# Patient Record
Sex: FEMALE | Race: ASIAN | NOT HISPANIC OR LATINO | URBAN - METROPOLITAN AREA
[De-identification: names, ages, dates, MRNs, and addresses within clinical notes are randomized per-mention and may not be internally consistent; named-entity substitution may affect disease eponyms.]

---

## 2021-09-14 PROBLEM — Z00.00 ENCOUNTER FOR PREVENTIVE HEALTH EXAMINATION: Status: ACTIVE | Noted: 2021-09-14

## 2021-09-21 ENCOUNTER — OUTPATIENT (OUTPATIENT)
Dept: OUTPATIENT SERVICES | Facility: HOSPITAL | Age: 26
LOS: 1 days | End: 2021-09-21
Payer: MEDICAID

## 2021-09-21 ENCOUNTER — APPOINTMENT (OUTPATIENT)
Dept: OBGYN | Facility: CLINIC | Age: 26
End: 2021-09-21
Payer: MEDICAID

## 2021-09-21 VITALS
TEMPERATURE: 97.8 F | BODY MASS INDEX: 29.84 KG/M2 | HEIGHT: 59.5 IN | SYSTOLIC BLOOD PRESSURE: 129 MMHG | DIASTOLIC BLOOD PRESSURE: 87 MMHG | OXYGEN SATURATION: 100 % | RESPIRATION RATE: 16 BRPM | WEIGHT: 150 LBS | HEART RATE: 94 BPM

## 2021-09-21 DIAGNOSIS — E28.2 POLYCYSTIC OVARIAN SYNDROME: ICD-10-CM

## 2021-09-21 DIAGNOSIS — Z01.419 ENCOUNTER FOR GYNECOLOGICAL EXAMINATION (GENERAL) (ROUTINE) W/OUT ABNORMAL FINDINGS: ICD-10-CM

## 2021-09-21 DIAGNOSIS — Z78.9 OTHER SPECIFIED HEALTH STATUS: ICD-10-CM

## 2021-09-21 DIAGNOSIS — Z12.39 ENCOUNTER FOR OTHER SCREENING FOR MALIGNANT NEOPLASM OF BREAST: ICD-10-CM

## 2021-09-21 DIAGNOSIS — Z11.3 ENCOUNTER FOR SCREENING FOR INFECTIONS WITH A PREDOMINANTLY SEXUAL MODE OF TRANSMISSION: ICD-10-CM

## 2021-09-21 DIAGNOSIS — Z34.00 ENCOUNTER FOR SUPERVISION OF NORMAL FIRST PREGNANCY, UNSPECIFIED TRIMESTER: ICD-10-CM

## 2021-09-21 PROCEDURE — 99204 OFFICE O/P NEW MOD 45 MIN: CPT | Mod: 25

## 2021-09-21 PROCEDURE — 81220 CFTR GENE COM VARIANTS: CPT

## 2021-09-21 PROCEDURE — 87591 N.GONORRHOEAE DNA AMP PROB: CPT

## 2021-09-21 PROCEDURE — 36415 COLL VENOUS BLD VENIPUNCTURE: CPT

## 2021-09-21 PROCEDURE — 83655 ASSAY OF LEAD: CPT

## 2021-09-21 PROCEDURE — 86900 BLOOD TYPING SEROLOGIC ABO: CPT

## 2021-09-21 PROCEDURE — 36415 COLL VENOUS BLD VENIPUNCTURE: CPT | Mod: NC

## 2021-09-21 PROCEDURE — 86787 VARICELLA-ZOSTER ANTIBODY: CPT

## 2021-09-21 PROCEDURE — 83036 HEMOGLOBIN GLYCOSYLATED A1C: CPT

## 2021-09-21 PROCEDURE — 87086 URINE CULTURE/COLONY COUNT: CPT

## 2021-09-21 PROCEDURE — 87340 HEPATITIS B SURFACE AG IA: CPT

## 2021-09-21 PROCEDURE — 85025 COMPLETE CBC W/AUTO DIFF WBC: CPT

## 2021-09-21 PROCEDURE — 86850 RBC ANTIBODY SCREEN: CPT

## 2021-09-21 PROCEDURE — 86803 HEPATITIS C AB TEST: CPT

## 2021-09-21 PROCEDURE — 87389 HIV-1 AG W/HIV-1&-2 AB AG IA: CPT

## 2021-09-21 PROCEDURE — 86480 TB TEST CELL IMMUN MEASURE: CPT

## 2021-09-21 PROCEDURE — 87491 CHLMYD TRACH DNA AMP PROBE: CPT

## 2021-09-21 PROCEDURE — 81025 URINE PREGNANCY TEST: CPT

## 2021-09-21 PROCEDURE — 84443 ASSAY THYROID STIM HORMONE: CPT

## 2021-09-21 PROCEDURE — 86901 BLOOD TYPING SEROLOGIC RH(D): CPT

## 2021-09-21 PROCEDURE — 81329 SMN1 GENE DOS/DELETION ALYS: CPT

## 2021-09-21 PROCEDURE — 86762 RUBELLA ANTIBODY: CPT

## 2021-09-21 PROCEDURE — 86780 TREPONEMA PALLIDUM: CPT

## 2021-09-21 PROCEDURE — 81003 URINALYSIS AUTO W/O SCOPE: CPT

## 2021-09-21 PROCEDURE — 81243 FMR1 GEN ALY DETC ABNL ALLEL: CPT

## 2021-09-21 PROCEDURE — 83020 HEMOGLOBIN ELECTROPHORESIS: CPT

## 2021-09-21 PROCEDURE — G0463: CPT

## 2021-09-21 PROCEDURE — 84439 ASSAY OF FREE THYROXINE: CPT

## 2021-09-21 PROCEDURE — 86765 RUBEOLA ANTIBODY: CPT

## 2021-09-21 RX ORDER — FOLIC ACID/MULTIVIT,IRON,MINER 0.4MG-18MG
200 TABLET ORAL
Qty: 30 | Refills: 11 | Status: ACTIVE | COMMUNITY
Start: 2021-09-21 | End: 1900-01-01

## 2021-09-22 DIAGNOSIS — Z3A.01 LESS THAN 8 WEEKS GESTATION OF PREGNANCY: ICD-10-CM

## 2021-09-22 LAB
ABO + RH PNL BLD: NORMAL
BASOPHILS # BLD AUTO: 0.05 K/UL
BASOPHILS NFR BLD AUTO: 0.5 %
BLD GP AB SCN SERPL QL: NORMAL
C TRACH RRNA SPEC QL NAA+PROBE: NOT DETECTED
EOSINOPHIL # BLD AUTO: 0.2 K/UL
EOSINOPHIL NFR BLD AUTO: 1.9 %
ESTIMATED AVERAGE GLUCOSE: 108 MG/DL
HBA1C MFR BLD HPLC: 5.4 %
HBV SURFACE AG SER QL: NONREACTIVE
HCT VFR BLD CALC: 46.7 %
HCV AB SER QL: NONREACTIVE
HCV S/CO RATIO: 0.09 S/CO
HGB A MFR BLD: 97.6 %
HGB A2 MFR BLD: 2.4 %
HGB BLD-MCNC: 14.9 G/DL
HGB FRACT BLD-IMP: NORMAL
HIV1+2 AB SPEC QL IA.RAPID: NONREACTIVE
IMM GRANULOCYTES NFR BLD AUTO: 0.3 %
LYMPHOCYTES # BLD AUTO: 2.58 K/UL
LYMPHOCYTES NFR BLD AUTO: 24.6 %
MAN DIFF?: NORMAL
MCHC RBC-ENTMCNC: 30.4 PG
MCHC RBC-ENTMCNC: 31.9 GM/DL
MCV RBC AUTO: 95.3 FL
MEV IGG FLD QL IA: 84 AU/ML
MEV IGG+IGM SER-IMP: POSITIVE
MONOCYTES # BLD AUTO: 0.92 K/UL
MONOCYTES NFR BLD AUTO: 8.8 %
N GONORRHOEA RRNA SPEC QL NAA+PROBE: NOT DETECTED
NEUTROPHILS # BLD AUTO: 6.71 K/UL
NEUTROPHILS NFR BLD AUTO: 63.9 %
PLATELET # BLD AUTO: 292 K/UL
RBC # BLD: 4.9 M/UL
RBC # FLD: 12.6 %
RUBV IGG FLD-ACNC: 1.8 INDEX
RUBV IGG SER-IMP: POSITIVE
SOURCE TP AMPLIFICATION: NORMAL
T PALLIDUM AB SER QL IA: NEGATIVE
T4 FREE SERPL-MCNC: 1.1 NG/DL
TSH SERPL-ACNC: 2.95 UIU/ML
VZV AB TITR SER: POSITIVE
VZV IGG SER IF-ACNC: 2052 INDEX
WBC # FLD AUTO: 10.49 K/UL

## 2021-09-22 RX ORDER — PRENATAL VIT NO.130/IRON/FOLIC 27MG-0.8MG
27-0.8 TABLET ORAL DAILY
Qty: 30 | Refills: 5 | Status: ACTIVE | COMMUNITY
Start: 2021-09-22 | End: 1900-01-01

## 2021-09-23 LAB
BACTERIA UR CULT: NORMAL
LEAD BLD-MCNC: <1 UG/DL

## 2021-09-24 ENCOUNTER — TRANSCRIPTION ENCOUNTER (OUTPATIENT)
Age: 26
End: 2021-09-24

## 2021-09-24 DIAGNOSIS — Z01.419 ENCOUNTER FOR GYNECOLOGICAL EXAMINATION (GENERAL) (ROUTINE) WITHOUT ABNORMAL FINDINGS: ICD-10-CM

## 2021-09-24 DIAGNOSIS — Z3A.01 LESS THAN 8 WEEKS GESTATION OF PREGNANCY: ICD-10-CM

## 2021-09-24 DIAGNOSIS — Z11.3 ENCOUNTER FOR SCREENING FOR INFECTIONS WITH A PREDOMINANTLY SEXUAL MODE OF TRANSMISSION: ICD-10-CM

## 2021-09-24 DIAGNOSIS — Z12.39 ENCOUNTER FOR OTHER SCREENING FOR MALIGNANT NEOPLASM OF BREAST: ICD-10-CM

## 2021-09-24 LAB
M TB IFN-G BLD-IMP: NEGATIVE
QUANTIFERON TB PLUS MITOGEN MINUS NIL: 8.78 IU/ML
QUANTIFERON TB PLUS NIL: 0.03 IU/ML
QUANTIFERON TB PLUS TB1 MINUS NIL: -0.01 IU/ML
QUANTIFERON TB PLUS TB2 MINUS NIL: -0.01 IU/ML

## 2021-09-27 LAB — CYTOLOGY CVX/VAG DOC THIN PREP: NORMAL

## 2021-09-28 LAB
AR GENE MUT ANL BLD/T: NORMAL
CFTR MUT TESTED BLD/T: NEGATIVE
FMR1 GENE MUT ANL BLD/T: NORMAL

## 2021-10-19 ENCOUNTER — APPOINTMENT (OUTPATIENT)
Dept: OBGYN | Facility: CLINIC | Age: 26
End: 2021-10-19

## 2022-01-19 ENCOUNTER — EMERGENCY (EMERGENCY)
Facility: HOSPITAL | Age: 27
LOS: 1 days | Discharge: STILL ADMITTED OR ADMINISTRATIVE | End: 2022-01-19
Admitting: EMERGENCY MEDICINE
Payer: SELF-PAY

## 2022-01-19 ENCOUNTER — APPOINTMENT (OUTPATIENT)
Dept: ANTEPARTUM | Facility: CLINIC | Age: 27
End: 2022-01-19

## 2022-01-19 ENCOUNTER — ASOB RESULT (OUTPATIENT)
Age: 27
End: 2022-01-19

## 2022-01-19 ENCOUNTER — INPATIENT (INPATIENT)
Facility: HOSPITAL | Age: 27
LOS: 18 days | Discharge: HOME | End: 2022-02-07
Attending: OBSTETRICS & GYNECOLOGY | Admitting: OBSTETRICS & GYNECOLOGY
Payer: MEDICAID

## 2022-01-19 VITALS
TEMPERATURE: 98 F | RESPIRATION RATE: 16 BRPM | OXYGEN SATURATION: 100 % | SYSTOLIC BLOOD PRESSURE: 154 MMHG | DIASTOLIC BLOOD PRESSURE: 122 MMHG | HEART RATE: 107 BPM

## 2022-01-19 VITALS — OXYGEN SATURATION: 99 % | HEART RATE: 109 BPM

## 2022-01-19 DIAGNOSIS — Z3A.00 WEEKS OF GESTATION OF PREGNANCY NOT SPECIFIED: ICD-10-CM

## 2022-01-19 DIAGNOSIS — O16.9 UNSPECIFIED MATERNAL HYPERTENSION, UNSPECIFIED TRIMESTER: ICD-10-CM

## 2022-01-19 DIAGNOSIS — O26.899 OTHER SPECIFIED PREGNANCY RELATED CONDITIONS, UNSPECIFIED TRIMESTER: ICD-10-CM

## 2022-01-19 DIAGNOSIS — O14.90 UNSPECIFIED PRE-ECLAMPSIA, UNSPECIFIED TRIMESTER: ICD-10-CM

## 2022-01-19 LAB
ALBUMIN SERPL ELPH-MCNC: 3.8 G/DL — SIGNIFICANT CHANGE UP (ref 3.3–5)
ALBUMIN SERPL ELPH-MCNC: 3.9 G/DL — SIGNIFICANT CHANGE UP (ref 3.3–5)
ALBUMIN SERPL ELPH-MCNC: 4.1 G/DL — SIGNIFICANT CHANGE UP (ref 3.3–5)
ALP SERPL-CCNC: 86 U/L — SIGNIFICANT CHANGE UP (ref 40–120)
ALP SERPL-CCNC: 91 U/L — SIGNIFICANT CHANGE UP (ref 40–120)
ALP SERPL-CCNC: 93 U/L — SIGNIFICANT CHANGE UP (ref 40–120)
ALT FLD-CCNC: 21 U/L — SIGNIFICANT CHANGE UP (ref 4–33)
ALT FLD-CCNC: 22 U/L — SIGNIFICANT CHANGE UP (ref 4–33)
ALT FLD-CCNC: 23 U/L — SIGNIFICANT CHANGE UP (ref 4–33)
ANION GAP SERPL CALC-SCNC: 13 MMOL/L — SIGNIFICANT CHANGE UP (ref 7–14)
ANION GAP SERPL CALC-SCNC: 15 MMOL/L — HIGH (ref 7–14)
ANION GAP SERPL CALC-SCNC: 17 MMOL/L — HIGH (ref 7–14)
APPEARANCE UR: CLEAR — SIGNIFICANT CHANGE UP
APTT BLD: 29.1 SEC — SIGNIFICANT CHANGE UP (ref 27–36.3)
APTT BLD: 29.2 SEC — SIGNIFICANT CHANGE UP (ref 27–36.3)
APTT BLD: 32 SEC — SIGNIFICANT CHANGE UP (ref 27–36.3)
AST SERPL-CCNC: 21 U/L — SIGNIFICANT CHANGE UP (ref 4–32)
AST SERPL-CCNC: 22 U/L — SIGNIFICANT CHANGE UP (ref 4–32)
AST SERPL-CCNC: 28 U/L — SIGNIFICANT CHANGE UP (ref 4–32)
BACTERIA # UR AUTO: NEGATIVE — SIGNIFICANT CHANGE UP
BASOPHILS # BLD AUTO: 0.05 K/UL — SIGNIFICANT CHANGE UP (ref 0–0.2)
BASOPHILS # BLD AUTO: 0.06 K/UL — SIGNIFICANT CHANGE UP (ref 0–0.2)
BASOPHILS # BLD AUTO: 0.07 K/UL — SIGNIFICANT CHANGE UP (ref 0–0.2)
BASOPHILS NFR BLD AUTO: 0.4 % — SIGNIFICANT CHANGE UP (ref 0–2)
BASOPHILS NFR BLD AUTO: 0.4 % — SIGNIFICANT CHANGE UP (ref 0–2)
BASOPHILS NFR BLD AUTO: 0.5 % — SIGNIFICANT CHANGE UP (ref 0–2)
BILIRUB SERPL-MCNC: 0.3 MG/DL — SIGNIFICANT CHANGE UP (ref 0.2–1.2)
BILIRUB SERPL-MCNC: 0.3 MG/DL — SIGNIFICANT CHANGE UP (ref 0.2–1.2)
BILIRUB SERPL-MCNC: 0.4 MG/DL — SIGNIFICANT CHANGE UP (ref 0.2–1.2)
BILIRUB UR-MCNC: NEGATIVE — SIGNIFICANT CHANGE UP
BUN SERPL-MCNC: 4 MG/DL — LOW (ref 7–23)
BUN SERPL-MCNC: 6 MG/DL — LOW (ref 7–23)
BUN SERPL-MCNC: 6 MG/DL — LOW (ref 7–23)
CALCIUM SERPL-MCNC: 7.5 MG/DL — LOW (ref 8.4–10.5)
CALCIUM SERPL-MCNC: 8.1 MG/DL — LOW (ref 8.4–10.5)
CALCIUM SERPL-MCNC: 9.6 MG/DL — SIGNIFICANT CHANGE UP (ref 8.4–10.5)
CHLORIDE SERPL-SCNC: 100 MMOL/L — SIGNIFICANT CHANGE UP (ref 98–107)
CHLORIDE SERPL-SCNC: 98 MMOL/L — SIGNIFICANT CHANGE UP (ref 98–107)
CHLORIDE SERPL-SCNC: 98 MMOL/L — SIGNIFICANT CHANGE UP (ref 98–107)
CO2 SERPL-SCNC: 20 MMOL/L — LOW (ref 22–31)
CO2 SERPL-SCNC: 20 MMOL/L — LOW (ref 22–31)
CO2 SERPL-SCNC: 21 MMOL/L — LOW (ref 22–31)
COLOR SPEC: YELLOW — SIGNIFICANT CHANGE UP
COVID-19 SPIKE DOMAIN AB INTERP: POSITIVE
COVID-19 SPIKE DOMAIN ANTIBODY RESULT: >250 U/ML — HIGH
CREAT ?TM UR-MCNC: 90 MG/DL — SIGNIFICANT CHANGE UP
CREAT SERPL-MCNC: 0.48 MG/DL — LOW (ref 0.5–1.3)
CREAT SERPL-MCNC: 0.49 MG/DL — LOW (ref 0.5–1.3)
CREAT SERPL-MCNC: 0.53 MG/DL — SIGNIFICANT CHANGE UP (ref 0.5–1.3)
DIFF PNL FLD: NEGATIVE — SIGNIFICANT CHANGE UP
EOSINOPHIL # BLD AUTO: 0.03 K/UL — SIGNIFICANT CHANGE UP (ref 0–0.5)
EOSINOPHIL # BLD AUTO: 0.06 K/UL — SIGNIFICANT CHANGE UP (ref 0–0.5)
EOSINOPHIL # BLD AUTO: 0.14 K/UL — SIGNIFICANT CHANGE UP (ref 0–0.5)
EOSINOPHIL NFR BLD AUTO: 0.2 % — SIGNIFICANT CHANGE UP (ref 0–6)
EOSINOPHIL NFR BLD AUTO: 0.5 % — SIGNIFICANT CHANGE UP (ref 0–6)
EOSINOPHIL NFR BLD AUTO: 1 % — SIGNIFICANT CHANGE UP (ref 0–6)
EPI CELLS # UR: 12 /HPF — HIGH (ref 0–5)
FIBRINOGEN PPP-MCNC: 562 MG/DL — HIGH (ref 290–520)
FIBRINOGEN PPP-MCNC: 602 MG/DL — HIGH (ref 290–520)
FIBRINOGEN PPP-MCNC: 637 MG/DL — HIGH (ref 290–520)
GLUCOSE SERPL-MCNC: 103 MG/DL — HIGH (ref 70–99)
GLUCOSE SERPL-MCNC: 125 MG/DL — HIGH (ref 70–99)
GLUCOSE SERPL-MCNC: 126 MG/DL — HIGH (ref 70–99)
GLUCOSE UR QL: NEGATIVE — SIGNIFICANT CHANGE UP
HCT VFR BLD CALC: 39.4 % — SIGNIFICANT CHANGE UP (ref 34.5–45)
HCT VFR BLD CALC: 42.3 % — SIGNIFICANT CHANGE UP (ref 34.5–45)
HCT VFR BLD CALC: 42.7 % — SIGNIFICANT CHANGE UP (ref 34.5–45)
HGB BLD-MCNC: 14 G/DL — SIGNIFICANT CHANGE UP (ref 11.5–15.5)
HGB BLD-MCNC: 14.5 G/DL — SIGNIFICANT CHANGE UP (ref 11.5–15.5)
HGB BLD-MCNC: 15.2 G/DL — SIGNIFICANT CHANGE UP (ref 11.5–15.5)
HYALINE CASTS # UR AUTO: 3 /LPF — SIGNIFICANT CHANGE UP (ref 0–7)
IANC: 10.31 K/UL — HIGH (ref 1.5–8.5)
IANC: 11.2 K/UL — HIGH (ref 1.5–8.5)
IANC: 8.43 K/UL — SIGNIFICANT CHANGE UP (ref 1.5–8.5)
IMM GRANULOCYTES NFR BLD AUTO: 0.6 % — SIGNIFICANT CHANGE UP (ref 0–1.5)
IMM GRANULOCYTES NFR BLD AUTO: 0.8 % — SIGNIFICANT CHANGE UP (ref 0–1.5)
IMM GRANULOCYTES NFR BLD AUTO: 1 % — SIGNIFICANT CHANGE UP (ref 0–1.5)
INR BLD: 1 RATIO — SIGNIFICANT CHANGE UP (ref 0.88–1.16)
INR BLD: 1.02 RATIO — SIGNIFICANT CHANGE UP (ref 0.88–1.16)
INR BLD: 1.03 RATIO — SIGNIFICANT CHANGE UP (ref 0.88–1.16)
KETONES UR-MCNC: ABNORMAL
LDH SERPL L TO P-CCNC: 184 U/L — SIGNIFICANT CHANGE UP (ref 135–225)
LDH SERPL L TO P-CCNC: 214 U/L — SIGNIFICANT CHANGE UP (ref 135–225)
LDH SERPL L TO P-CCNC: 289 U/L — HIGH (ref 135–225)
LEUKOCYTE ESTERASE UR-ACNC: NEGATIVE — SIGNIFICANT CHANGE UP
LYMPHOCYTES # BLD AUTO: 16.2 % — SIGNIFICANT CHANGE UP (ref 13–44)
LYMPHOCYTES # BLD AUTO: 2.32 K/UL — SIGNIFICANT CHANGE UP (ref 1–3.3)
LYMPHOCYTES # BLD AUTO: 2.67 K/UL — SIGNIFICANT CHANGE UP (ref 1–3.3)
LYMPHOCYTES # BLD AUTO: 21.6 % — SIGNIFICANT CHANGE UP (ref 13–44)
LYMPHOCYTES # BLD AUTO: 22.2 % — SIGNIFICANT CHANGE UP (ref 13–44)
LYMPHOCYTES # BLD AUTO: 3.16 K/UL — SIGNIFICANT CHANGE UP (ref 1–3.3)
MAGNESIUM SERPL-MCNC: 4.9 MG/DL — HIGH (ref 1.6–2.6)
MCHC RBC-ENTMCNC: 29.7 PG — SIGNIFICANT CHANGE UP (ref 27–34)
MCHC RBC-ENTMCNC: 30.4 PG — SIGNIFICANT CHANGE UP (ref 27–34)
MCHC RBC-ENTMCNC: 30.4 PG — SIGNIFICANT CHANGE UP (ref 27–34)
MCHC RBC-ENTMCNC: 34.3 GM/DL — SIGNIFICANT CHANGE UP (ref 32–36)
MCHC RBC-ENTMCNC: 35.5 GM/DL — SIGNIFICANT CHANGE UP (ref 32–36)
MCHC RBC-ENTMCNC: 35.6 GM/DL — SIGNIFICANT CHANGE UP (ref 32–36)
MCV RBC AUTO: 85.4 FL — SIGNIFICANT CHANGE UP (ref 80–100)
MCV RBC AUTO: 85.7 FL — SIGNIFICANT CHANGE UP (ref 80–100)
MCV RBC AUTO: 86.7 FL — SIGNIFICANT CHANGE UP (ref 80–100)
MONOCYTES # BLD AUTO: 0.56 K/UL — SIGNIFICANT CHANGE UP (ref 0–0.9)
MONOCYTES # BLD AUTO: 0.7 K/UL — SIGNIFICANT CHANGE UP (ref 0–0.9)
MONOCYTES # BLD AUTO: 0.83 K/UL — SIGNIFICANT CHANGE UP (ref 0–0.9)
MONOCYTES NFR BLD AUTO: 3.9 % — SIGNIFICANT CHANGE UP (ref 2–14)
MONOCYTES NFR BLD AUTO: 5.7 % — SIGNIFICANT CHANGE UP (ref 2–14)
MONOCYTES NFR BLD AUTO: 5.8 % — SIGNIFICANT CHANGE UP (ref 2–14)
NEUTROPHILS # BLD AUTO: 10.31 K/UL — HIGH (ref 1.8–7.4)
NEUTROPHILS # BLD AUTO: 11.2 K/UL — HIGH (ref 1.8–7.4)
NEUTROPHILS # BLD AUTO: 8.43 K/UL — HIGH (ref 1.8–7.4)
NEUTROPHILS NFR BLD AUTO: 70.1 % — SIGNIFICANT CHANGE UP (ref 43–77)
NEUTROPHILS NFR BLD AUTO: 70.6 % — SIGNIFICANT CHANGE UP (ref 43–77)
NEUTROPHILS NFR BLD AUTO: 78.5 % — HIGH (ref 43–77)
NITRITE UR-MCNC: NEGATIVE — SIGNIFICANT CHANGE UP
NRBC # BLD: 0 /100 WBCS — SIGNIFICANT CHANGE UP
NRBC # FLD: 0 K/UL — SIGNIFICANT CHANGE UP
PH UR: 7 — SIGNIFICANT CHANGE UP (ref 5–8)
PLATELET # BLD AUTO: 267 K/UL — SIGNIFICANT CHANGE UP (ref 150–400)
PLATELET # BLD AUTO: 291 K/UL — SIGNIFICANT CHANGE UP (ref 150–400)
PLATELET # BLD AUTO: 298 K/UL — SIGNIFICANT CHANGE UP (ref 150–400)
POTASSIUM SERPL-MCNC: 4.1 MMOL/L — SIGNIFICANT CHANGE UP (ref 3.5–5.3)
POTASSIUM SERPL-MCNC: 4.2 MMOL/L — SIGNIFICANT CHANGE UP (ref 3.5–5.3)
POTASSIUM SERPL-MCNC: 4.2 MMOL/L — SIGNIFICANT CHANGE UP (ref 3.5–5.3)
POTASSIUM SERPL-SCNC: 4.1 MMOL/L — SIGNIFICANT CHANGE UP (ref 3.5–5.3)
POTASSIUM SERPL-SCNC: 4.2 MMOL/L — SIGNIFICANT CHANGE UP (ref 3.5–5.3)
POTASSIUM SERPL-SCNC: 4.2 MMOL/L — SIGNIFICANT CHANGE UP (ref 3.5–5.3)
PROT ?TM UR-MCNC: 116 MG/DL — SIGNIFICANT CHANGE UP
PROT ?TM UR-MCNC: 116 MG/DL — SIGNIFICANT CHANGE UP
PROT SERPL-MCNC: 6.8 G/DL — SIGNIFICANT CHANGE UP (ref 6–8.3)
PROT SERPL-MCNC: 7.2 G/DL — SIGNIFICANT CHANGE UP (ref 6–8.3)
PROT SERPL-MCNC: 7.4 G/DL — SIGNIFICANT CHANGE UP (ref 6–8.3)
PROT UR-MCNC: ABNORMAL
PROT/CREAT UR-RTO: 1.3 RATIO — HIGH (ref 0–0.2)
PROTHROM AB SERPL-ACNC: 11.4 SEC — SIGNIFICANT CHANGE UP (ref 10.6–13.6)
PROTHROM AB SERPL-ACNC: 11.6 SEC — SIGNIFICANT CHANGE UP (ref 10.6–13.6)
PROTHROM AB SERPL-ACNC: 11.8 SEC — SIGNIFICANT CHANGE UP (ref 10.6–13.6)
RBC # BLD: 4.6 M/UL — SIGNIFICANT CHANGE UP (ref 3.8–5.2)
RBC # BLD: 4.88 M/UL — SIGNIFICANT CHANGE UP (ref 3.8–5.2)
RBC # BLD: 5 M/UL — SIGNIFICANT CHANGE UP (ref 3.8–5.2)
RBC # FLD: 12.6 % — SIGNIFICANT CHANGE UP (ref 10.3–14.5)
RBC # FLD: 12.7 % — SIGNIFICANT CHANGE UP (ref 10.3–14.5)
RBC # FLD: 12.8 % — SIGNIFICANT CHANGE UP (ref 10.3–14.5)
RBC CASTS # UR COMP ASSIST: 2 /HPF — SIGNIFICANT CHANGE UP (ref 0–4)
SARS-COV-2 IGG+IGM SERPL QL IA: >250 U/ML — HIGH
SARS-COV-2 IGG+IGM SERPL QL IA: POSITIVE
SODIUM SERPL-SCNC: 131 MMOL/L — LOW (ref 135–145)
SODIUM SERPL-SCNC: 134 MMOL/L — LOW (ref 135–145)
SODIUM SERPL-SCNC: 137 MMOL/L — SIGNIFICANT CHANGE UP (ref 135–145)
SP GR SPEC: 1.02 — SIGNIFICANT CHANGE UP (ref 1–1.05)
URATE SERPL-MCNC: 3.7 MG/DL — SIGNIFICANT CHANGE UP (ref 2.5–7)
URATE SERPL-MCNC: 4.2 MG/DL — SIGNIFICANT CHANGE UP (ref 2.5–7)
URATE SERPL-MCNC: 4.7 MG/DL — SIGNIFICANT CHANGE UP (ref 2.5–7)
UROBILINOGEN FLD QL: SIGNIFICANT CHANGE UP
WBC # BLD: 12.03 K/UL — HIGH (ref 3.8–10.5)
WBC # BLD: 14.28 K/UL — HIGH (ref 3.8–10.5)
WBC # BLD: 14.6 K/UL — HIGH (ref 3.8–10.5)
WBC # FLD AUTO: 12.03 K/UL — HIGH (ref 3.8–10.5)
WBC # FLD AUTO: 14.28 K/UL — HIGH (ref 3.8–10.5)
WBC # FLD AUTO: 14.6 K/UL — HIGH (ref 3.8–10.5)
WBC UR QL: 4 /HPF — SIGNIFICANT CHANGE UP (ref 0–5)

## 2022-01-19 PROCEDURE — L9996: CPT

## 2022-01-19 RX ORDER — MAGNESIUM SULFATE 500 MG/ML
2 VIAL (ML) INJECTION
Qty: 40 | Refills: 0 | Status: DISCONTINUED | OUTPATIENT
Start: 2022-01-19 | End: 2022-01-20

## 2022-01-19 RX ORDER — MAGNESIUM SULFATE 500 MG/ML
4 VIAL (ML) INJECTION ONCE
Refills: 0 | Status: COMPLETED | OUTPATIENT
Start: 2022-01-19 | End: 2022-01-19

## 2022-01-19 RX ORDER — SODIUM CHLORIDE 9 MG/ML
1000 INJECTION, SOLUTION INTRAVENOUS
Refills: 0 | Status: DISCONTINUED | OUTPATIENT
Start: 2022-01-19 | End: 2022-01-20

## 2022-01-19 RX ORDER — ACETAMINOPHEN 500 MG
975 TABLET ORAL ONCE
Refills: 0 | Status: COMPLETED | OUTPATIENT
Start: 2022-01-19 | End: 2022-01-19

## 2022-01-19 RX ORDER — NIFEDIPINE 30 MG
30 TABLET, EXTENDED RELEASE 24 HR ORAL DAILY
Refills: 0 | Status: DISCONTINUED | OUTPATIENT
Start: 2022-01-19 | End: 2022-01-22

## 2022-01-19 RX ORDER — NIFEDIPINE 30 MG
10 TABLET, EXTENDED RELEASE 24 HR ORAL ONCE
Refills: 0 | Status: DISCONTINUED | OUTPATIENT
Start: 2022-01-19 | End: 2022-01-19

## 2022-01-19 RX ADMIN — SODIUM CHLORIDE 50 MILLILITER(S): 9 INJECTION, SOLUTION INTRAVENOUS at 15:25

## 2022-01-19 RX ADMIN — Medication 975 MILLIGRAM(S): at 10:43

## 2022-01-19 RX ADMIN — Medication 50 GM/HR: at 19:13

## 2022-01-19 RX ADMIN — Medication 975 MILLIGRAM(S): at 08:52

## 2022-01-19 RX ADMIN — Medication 50 GM/HR: at 11:01

## 2022-01-19 RX ADMIN — Medication 300 GRAM(S): at 10:38

## 2022-01-19 RX ADMIN — Medication 30 MILLIGRAM(S): at 14:54

## 2022-01-19 NOTE — PROGRESS NOTE ADULT - SUBJECTIVE AND OBJECTIVE BOX
Patient is a 26y old  Female who presents with a chief complaint of headache and elevated blood pressure    HPI:  26 y.o.  at 23w presenting from ED with complaints of blood pressure of 150/120. Patient reports diagnosis of gestational hypertension yesterday in OB office and was started on Procardia 30XL, last dose yesterday at 1600.  Patient reports headache since  yesterday, has not taken any meds for relief.  Patient reports visual disturbances, and nausea.  Patient denies RUQ and vomiting  Patient denies any OB complaints, reports positive fetal movement, denies LOF, denies vaginal bleeding, denies contractions     a/p course complicated by Gestational Hypertension  Prenatal Care from [facility]     pmh: COVID 2021  psh: denies   obhx: denies   gynhx: PCO     Meds: Nifidipine 30XL   Metformin 500mg daily   Aspirin 81mg  PNV  (2022 09:31)      PAST MEDICAL & SURGICAL HISTORY:  Migraine    History of PCOS        MEDICATIONS  (STANDING):  magnesium sulfate  IVPB 4 Gram(s) IV Intermittent once  magnesium sulfate Infusion 2 Gm/Hr (50 mL/Hr) IV Continuous <Continuous>              Vital Signs Last 24 Hrs  T(C): 36.9 (2022 08:30), Max: 36.9 (2022 08:30)  T(F): 98.4 (2022 08:30), Max: 98.4 (2022 08:30)  HR: 123 (2022 10:14) (101 - 123)  BP: 154/100 (2022 09:48) (137/79 - 171/99)  BP(mean): --  RR: 1 (2022 08:30) (1 - 1)  SpO2: 100% (2022 10:14) (99% - 100%)    PHYSICAL EXAM:  The patient did not have severe headache at the time of examination.   Her BP was 154/100.  No abdominal pain or tenderness  Bedside ultrasound done showed severe early onset IUGR with EFW of 355 grams  There is absent endiastolic flow, Normal amniotic fluid noted.    These findings were discussed with her.  She was told that severe IUGR with Absent End diastolic flow carries a n extremely poor prognosis for the fetus and greatly reduces the likelyhood or normal survival. Although conservative management may help prolong the pregnancym the severely elevated blood pressure and headache puts her at significant risk of morbidity and mortality.  We discussed different management options and offered her pregnancy termination as definitive treatment for this severe preeclampsia.  The patient will discuss this with her family  Meanwhile will start her on magnesium sulphate and use antihypertensives to lower blood pressure.  Serial preeclampsia labs have been ordered.  Will follow.      I&O's Summary      LABORATORY:                        15.2   14.60 )-----------( 291      ( 2022 08:59 )             42.7         137  |  100  |  6<L>  ----------------------------<  126<H>  4.1   |  20<L>  |  0.49<L>    Ca    9.6      2022 08:59    TPro  7.2  /  Alb  4.1  /  TBili  0.3  /  DBili  x   /  AST  22  /  ALT  21  /  AlkPhos  93  -    PT/INR - ( 2022 08:59 )   PT: 11.8 sec;   INR: 1.03 ratio         PTT - ( 2022 08:59 )  PTT:32.0 sec  Urinalysis Basic - ( 2022 08:59 )    Color: Yellow / Appearance: Clear / S.020 / pH: x  Gluc: x / Ketone: Moderate  / Bili: Negative / Urobili: <2 mg/dL   Blood: x / Protein: 100 mg/dL / Nitrite: Negative   Leuk Esterase: Negative / RBC: 2 /HPF / WBC 4 /HPF   Sq Epi: x / Non Sq Epi: 12 /HPF / Bacteria: Negative

## 2022-01-19 NOTE — OB PROVIDER H&P - ASSESSMENT
Admit to Antepartum Unit for Severe Pre Eclampsia   Routine Admission Labs  COVID 19 Deferred; Tested Positive 12/25/2021   No Objection to Blood Transfusion, Consent Obtained   HELLP Labs Collected and Pending Full Results   For ATU Sonogram   M Consulted; Dr. Longoria Aware   For Magnesium Sulfate and Possible Betamethasone pending ATU Scan   --HELLP Labs Per Unit  Protocol     D/w Dr. Longoria  D/w Dr. Soniya Flanagan NP

## 2022-01-19 NOTE — OB PROVIDER TRIAGE NOTE - HISTORY OF PRESENT ILLNESS
26 y.o.  at 23w presenting from ED with complaints of blood pressure of 150/120. Patient reports diagnosis of gestational hypertension yesterday in OB office and was started on Procardia 30XL, last dose yesterday at 1600.  Patient reports headache since  yesterday, has not taken any meds for relief.  Patient reports visual disturbances, and nausea.  Patient denies RUQ and vomiting  Patient denies any OB complaints, reports positive fetal movement, denies LOF, denies vaginal bleeding, denies contractions     a/p course complicated by Gestational Hypertension  Prenatal Care from [facility]     pmh: COVID 2021  psh: denies   obhx: denies   gynhx: PCO     Meds: Nifidipine 30XL   Metformin 500mg daily   Aspirin 81mg  PNV

## 2022-01-19 NOTE — OB PROVIDER TRIAGE NOTE - NSHPPHYSICALEXAM_GEN_ALL_CORE
Vital Signs Last 24 Hrs  T(C): 36.9 (19 Jan 2022 08:30), Max: 36.9 (19 Jan 2022 08:30)  T(F): 98.4 (19 Jan 2022 08:30), Max: 98.4 (19 Jan 2022 08:30)  HR: 104 (19 Jan 2022 08:59) (103 - 115)  BP: 154/89 (19 Jan 2022 08:58) (151/92 - 171/99)  BP(mean): --  RR: 1 (19 Jan 2022 08:30) (1 - 1)  SpO2: 100% (19 Jan 2022 09:04) (99% - 100%)    Elevated Blood Pressures Visualized   One severe BP noted at this time    TAUS: FHR 135bpm

## 2022-01-19 NOTE — OB PROVIDER H&P - NSHPPHYSICALEXAM_GEN_ALL_CORE
Vital Signs Last 24 Hrs  T(C): 36.9 (19 Jan 2022 08:30), Max: 36.9 (19 Jan 2022 08:30)  T(F): 98.4 (19 Jan 2022 08:30), Max: 98.4 (19 Jan 2022 08:30)  HR: 104 (19 Jan 2022 08:59) (103 - 115)  BP: 154/89 (19 Jan 2022 08:58) (151/92 - 171/99)  BP(mean): --  RR: 1 (19 Jan 2022 08:30) (1 - 1)  SpO2: 100% (19 Jan 2022 09:04) (99% - 100%)    Elevated Blood Pressures Visualized   One severe BP noted at this time    TAUS: FHR 135bpm  [place]

## 2022-01-19 NOTE — OB PROVIDER H&P - NSHPLABSRESULTS_GEN_ALL_CORE
15.2   14.60 )-----------( 291      ( 01-19 @ 08:59 )             42.7           PT/INR - ( 01-19 @ 08:59 )   PT: 11.8 sec;   INR: 1.03 ratio    PTT - ( 01-19 @ 08:59 )  PTT:32.0 sec    Uric Acid: (01-19 @ 08:59)  --       Fibrinogen: (01-19 @ 08:59)  637      LDH: (01-19 @ 08:59)  --

## 2022-01-19 NOTE — PROGRESS NOTE ADULT - SUBJECTIVE AND OBJECTIVE BOX
25 y/o  at 23 weeks with sPEC/Mg, IUGR AEDV.    Dr Longoria present at bedside to [place] pt on dx of sPEC and IUGR. explained to the pt at length risks of sPEC  as well as risks to fetus if pregnancy is continued.    pt states she will discuss with family    continue to monitor

## 2022-01-19 NOTE — OB RN TRIAGE NOTE - FALL HARM RISK - UNIVERSAL INTERVENTIONS
Bed in lowest position, wheels locked, appropriate side rails in place/Call bell, personal items and telephone in reach/Instruct patient to call for assistance before getting out of bed or chair/Non-slip footwear when patient is out of bed/[place] to call system/Physically safe environment - no spills, clutter or unnecessary equipment/Purposeful Proactive Rounding/Room/bathroom lighting operational, light cord in reach

## 2022-01-19 NOTE — CONSULT NOTE ADULT - SUBJECTIVE AND OBJECTIVE BOX
Ms. Amador is a 25 y/o  admitted at 23w gestational age. Maternal history notable for PCOS, and prenatal history notable for sPEC and COVID in 2021. Most recent EFW 350g on 2022. NICU consulted to discuss what to expect if she were to deliver at 23 weeks gestation. NICU team met with Ms. Amador and her sister on L&D and discussed the followin. 23 weeks is considered the lower limit of viability, and parents may opt to pursue either comfort care or a full resuscitation should she go on to deliver at this time. At 23 weeks, baby will likely have a heart rate after delivery. If parents elect for comfort care, the infant will be warmed, swaddled, and would be able to stay in the room with parents.    2. If parents opt for full resuscitative efforts, the NICU team will be present at the time of delivery, and the infant will be placed under the warmer and immediately evaluated. The survival rate for infants born at 23 weeks gestation at this center is approximately 50%, however lower in this case due to placental insufficiency and AEDF.    3. Due to severe prematurity the infant will need respiratory support, either in the form of CPAP or intubation with mechanical ventilation. If the infant requires intubation, the survivability depends on size of the trachea and being able to intubate the infant.    Ms. Amador and her sister stated that this was a lot of information for them to process at this time, and they want to stay hopeful and continue with getting the blood pressure under control. Further discussion regarding the management of infant in the NICU was held for the patient and her sister to process the information that has been given. They had the chance to ask any questions and were encouraged to contact the NICU at that time if additional questions arise.    Thank you for the opportunity to participate in the care of this patient and please inform us of any changes in her status. Ms. Amador is a 25 y/o  admitted at 23w gestational age. Maternal history notable for PCOS, and prenatal history notable for sPEC and COVID in 2021. Most recent EFW 350g on 2022. NICU consulted to discuss what to expect if she were to deliver at 23 weeks gestation. NICU team met with Ms. Amador and her sister on L&D and discussed the followin. 23 weeks is considered the lower limit of viability, and parents may opt to pursue either comfort care or a full resuscitation should she go on to deliver at this time. At 23 weeks, baby will likely have a heart rate after delivery. If parents elect for comfort care, the infant will be warmed, swaddled, and would be able to stay in the room with parents.    2. If parents opt for full resuscitative efforts, the NICU team will be present at the time of delivery, and the infant will be placed under the warmer and immediately evaluated. The survival rate for infants born at 23 weeks gestation at this center is approximately 50%, however lower in this case due to placental insufficiency and AEDF.    3. Due to severe prematurity the infant will need respiratory support, either in the form of CPAP or intubation with mechanical ventilation. If the infant requires intubation, the survivability depends on size of the trachea and being able to intubate the infant.    Ms. Amador and her sister stated that this was a lot of information for them to process at this time, and they want to stay hopeful and continue with getting the blood pressure under control. Further discussion regarding the management of infant in the NICU was held for the patient and her sister to process the information that has been given. They had the chance to ask any questions and were encouraged to contact the NICU at that time if additional questions arise.    Thank you for the opportunity to participate in the care of this patient and please inform us of any changes in her status.      See attending attestation for further comments

## 2022-01-19 NOTE — CONSULT NOTE ADULT - ATTENDING COMMENTS
I was present for this conversation and had further extensive discussion with Dr. Longoria regarding the approach to this fetus. I explained to the mother that the outlook for the infant is extremely poor with very low survival. I also explained that the infant was very like to suffer severe mental disability and Cerebral palsy should he survive NICU hospitalization. The mother noted that she has had tremendous difficulty becoming pregnant. At this point the mother has not made a decision to withhold resuscitation when given the option. The mother and sister agreed all of their questions had been answered. We told them that we were available to discuss the situation again as it evolved.

## 2022-01-19 NOTE — CHART NOTE - NSCHARTNOTEFT_GEN_A_CORE
R3 Note    Note delayed 2/2 to clinical responsibilities    Patient is a 25 yo  at 23 wks gestational age presenting to ED with elevated blood pressures and recent diagnosis of gHTN.  Patient given diagnosis this week, started on Procardia 30 XL, ASA 81 and metformin 500 mg BID x1 day.  Patient presented with initial BP of 150 systolic and persistent headache yesterday.  She reports this pregnancy has thus far been uncomplicated so far.  She has received routine PNC.  +fetal movement.  Denies VB or LOF.  She is accompanied by sister Radha at this time, former L&D scrub tech at [employer].    [facility]: [facility]    PNC: uncomplicated, recent diagnosis of gHTN on     ObHx: G1- current  GynHx: denies fibroids, ovarian cysts, STIs, abnormal pap smear  PMH: PCOS  PSH: none  All: NKDA  Meds: ASA 81, Procardia 30 XL, Metformin 500 BID  Social: denies use of tobacco, alcohol, recreational drug use  Psych: denies anxiety, depression    Vital Signs Last 24 Hrs  T(C): 36.6 (2022 18:00), Max: 36.9 (2022 08:30)  T(F): 97.88 (2022 18:00), Max: 98.4 (2022 08:30)  HR: 103 (2022 19:02) (94 - 123)  BP: 143/82 (2022 19:02) (123/73 - 171/99)  BP(mean): --  RR: 1 (2022 08:30) (1 - 1)  SpO2: 99% (2022 19:02) (97% - 100%)    Gen: NAD  CV: RRR  Pulm: nonlabored breathing on room air  Abd: soft, gravid               14.0   12.03 )-----------( 267      (  @ 17:26 )             39.4      17:26    134  |  98  |  4   ----------------------------<  103  4.2   |  21  |  0.53    Ca    8.1       @ 17:26  Mg     4.90      @ 17:26    TPro  6.8  /  Alb  3.8  /  TBili  0.3  /  DBili  x   /  AST  21  /  ALT  22  /  AlkPhos  86   @ 17:26    PT/INR - (  @ 17:26 )   PT: 11.4 sec;   INR: 1.00 ratio    PTT - (  @ 17:26 )  PTT:29.2 sec    Uric Acid: ( @ 17:26)  4.2      Fibrinogen: ( @ 17:26)  562      LDH: ( @ 17:26)  184      ATU sono (): EFW 355g, AEDV    A/P: 25 yo  at 23 wks presenting with severe range blood pressures and proteinuria meeting criteria for sPEC.  Additionally, fetus found to be of a non-viable weight with AEDV on UAD overall indicating poor prognosis. Patient counseled on diagnosis of sPEC and options at this age including, proceeding with pregnancy with understanding of risks of severe prematurity or interruption of pregnancy.  Patient is electing to continue to the pregnancy after adequate counseling.      #sPEC  -patient aware and expressed understanding of maternal risks including seizure, stroke, end organ damage and fetal risks include severe prematurity and associated sequelae, growth restriction, and fetal demise  -magnesium started, to be completed x24 hours for seizure ppx  -HELLP labs q6h  -initial abnormal lab value of P/C= 1.3, all other labs wnl  -BP monitoring  -c/w Procardia 30 XL for stabilization of BPs    #Fetal well being  -discussion had on size of fetus with finding of elevated UAD.  Patient and team in agreement to defer fetal monitoring and steroids at this time as fetus is not of a viable weight  -daily FH check  -s/p NICU consultation , patient expressed understanding of the risks of severe prematurity  -comfort care in case of delivery at this time  -will reassess monitoring for fetal intervention at 24 wks  -consents signed for classical  section in case of emergent situation    #Maternal well being  -NPO overnight until stabilized  -SCDs for DVT ppx    d/w Dr. Horta and Dr. Swati Hicks PGY3 R3 Note    Note delayed 2/2 to clinical responsibilities    Patient is a 27 yo  at 23 wks gestational age presenting to ED with elevated blood pressures and recent diagnosis of gHTN.  Patient given diagnosis this week, started on Procardia 30 XL, ASA 81 and metformin 500 mg BID x1 day.  Patient presented with initial BP of 150 systolic and persistent headache yesterday.  She reports this pregnancy has thus far been uncomplicated so far.  She has received routine PNC.  +fetal movement.  Denies VB or LOF.  She is accompanied by sister Radha at this time, former L&D scrub tech at [employer].    [facility]: [facility]    PNC: uncomplicated, recent diagnosis of gHTN on     ObHx: G1- current  GynHx: denies fibroids, ovarian cysts, STIs, abnormal pap smear  PMH: PCOS  PSH: none  All: NKDA  Meds: ASA 81, Procardia 30 XL, Metformin 500 BID  Social: denies use of tobacco, alcohol, recreational drug use  Psych: denies anxiety, depression    Vital Signs Last 24 Hrs  T(C): 36.6 (2022 18:00), Max: 36.9 (2022 08:30)  T(F): 97.88 (2022 18:00), Max: 98.4 (2022 08:30)  HR: 103 (2022 19:02) (94 - 123)  BP: 143/82 (2022 19:02) (123/73 - 171/99)  BP(mean): --  RR: 1 (2022 08:30) (1 - 1)  SpO2: 99% (2022 19:02) (97% - 100%)    Gen: NAD  CV: RRR  Pulm: nonlabored breathing on room air  Abd: soft, gravid               14.0   12.03 )-----------( 267      (  @ 17:26 )             39.4      17:26    134  |  98  |  4   ----------------------------<  103  4.2   |  21  |  0.53    Ca    8.1       @ 17:26  Mg     4.90      @ 17:26    TPro  6.8  /  Alb  3.8  /  TBili  0.3  /  DBili  x   /  AST  21  /  ALT  22  /  AlkPhos  86   @ 17:26    PT/INR - (  @ 17:26 )   PT: 11.4 sec;   INR: 1.00 ratio    PTT - (  @ 17:26 )  PTT:29.2 sec    Uric Acid: ( @ 17:26)  4.2      Fibrinogen: ( @ 17:26)  562      LDH: ( @ 17:26)  184      ATU sono (): EFW 355g, AEDV    A/P: 27 yo  at 23 wks presenting with severe range blood pressures and proteinuria meeting criteria for sPEC.  Additionally, fetus found to be of a non-viable weight with AEDV on UAD overall indicating poor prognosis. Patient counseled on diagnosis of sPEC and options at this age including, proceeding with pregnancy with understanding of risks of severe prematurity or interruption of pregnancy.  Patient is electing to continue to the pregnancy after adequate counseling.      #sPEC  -patient aware and expressed understanding of maternal risks including seizure, stroke, end organ damage and fetal risks include severe prematurity and associated sequelae, growth restriction, and fetal demise  -magnesium started, to be completed x24 hours for seizure ppx  -HELLP labs q6h  -initial abnormal lab value of P/C= 1.3, all other labs wnl  -BP monitoring  -c/w Procardia 30 XL for stabilization of BPs    #Fetal well being  -discussion had on size of fetus with finding of elevated UAD.  Patient and team in agreement to defer fetal monitoring and steroids at this time as fetus is not of a viable weight  -daily FH check  -s/p NICU consultation , patient expressed understanding of the risks of severe prematurity  -comfort care in case of delivery at this time  -will reassess monitoring for fetal intervention at 24 wks  -consents signed for classical  section in case of emergent situation    #Maternal well being  -NPO overnight until stabilized  -SCDs for DVT ppx    d/w Dr. Horta and Dr. Swati Hicks PGY3    MFM Fellow Note     Patient is a  26 yr  @ 23 0/7 weeks admitted for preeclampsia with severe features with severe FGR. Patient was counseled by NICU and MFM extensively. At this point she would like expectant management and fetal monitoring at 24 weeks. If delivery is needed prior to 24 weeks, she would like comfort care. Will continue to monitor patient closely for signs and symptoms of worsening preeclampsia with BP monitoring and serial HELLP labs. Reviewed above assessment and plan.     Silvestre Horta MD   MFM Fellow

## 2022-01-19 NOTE — PROGRESS NOTE ADULT - SUBJECTIVE AND OBJECTIVE BOX
mfm follow up note:  Discussed management with patient and risks were again discussed in detail with her and her sister. the patient wants to continue pregnancy at this time  We will reassess risks as her condition changes. In view of the size of the fetus will not initiate monitoring or steroids.  We discussed the possibility of fetal demise as well as potential serious maternal complications.

## 2022-01-19 NOTE — ED ADULT TRIAGE NOTE - CHIEF COMPLAINT QUOTE
alert oriented 23 weeks pregnant c/o headache dizziness and nausea Blood Pressure 154/122  no CP or SOB dx with gestational diabetes and HTN  spoke with Devi send to [facility]

## 2022-01-20 ENCOUNTER — ASOB RESULT (OUTPATIENT)
Age: 27
End: 2022-01-20

## 2022-01-20 ENCOUNTER — APPOINTMENT (OUTPATIENT)
Dept: ANTEPARTUM | Facility: HOSPITAL | Age: 27
End: 2022-01-20
Payer: MEDICAID

## 2022-01-20 PROBLEM — Z87.42 PERSONAL HISTORY OF OTHER DISEASES OF THE FEMALE GENITAL TRACT: Chronic | Status: ACTIVE | Noted: 2022-01-19

## 2022-01-20 LAB
ALBUMIN SERPL ELPH-MCNC: 3.7 G/DL — SIGNIFICANT CHANGE UP (ref 3.3–5)
ALP SERPL-CCNC: 84 U/L — SIGNIFICANT CHANGE UP (ref 40–120)
ALT FLD-CCNC: 19 U/L — SIGNIFICANT CHANGE UP (ref 4–33)
ANION GAP SERPL CALC-SCNC: 11 MMOL/L — SIGNIFICANT CHANGE UP (ref 7–14)
APTT BLD: 30.7 SEC — SIGNIFICANT CHANGE UP (ref 27–36.3)
AST SERPL-CCNC: 22 U/L — SIGNIFICANT CHANGE UP (ref 4–32)
BASOPHILS # BLD AUTO: 0.04 K/UL — SIGNIFICANT CHANGE UP (ref 0–0.2)
BASOPHILS NFR BLD AUTO: 0.3 % — SIGNIFICANT CHANGE UP (ref 0–2)
BILIRUB SERPL-MCNC: 0.4 MG/DL — SIGNIFICANT CHANGE UP (ref 0.2–1.2)
BUN SERPL-MCNC: 7 MG/DL — SIGNIFICANT CHANGE UP (ref 7–23)
CALCIUM SERPL-MCNC: 6.9 MG/DL — LOW (ref 8.4–10.5)
CHLORIDE SERPL-SCNC: 98 MMOL/L — SIGNIFICANT CHANGE UP (ref 98–107)
CO2 SERPL-SCNC: 21 MMOL/L — LOW (ref 22–31)
COLLECT DURATION TIME UR: 24 HR — SIGNIFICANT CHANGE UP
CREAT SERPL-MCNC: 0.49 MG/DL — LOW (ref 0.5–1.3)
EOSINOPHIL # BLD AUTO: 0.03 K/UL — SIGNIFICANT CHANGE UP (ref 0–0.5)
EOSINOPHIL NFR BLD AUTO: 0.2 % — SIGNIFICANT CHANGE UP (ref 0–6)
FIBRINOGEN PPP-MCNC: 499 MG/DL — SIGNIFICANT CHANGE UP (ref 290–520)
GLUCOSE SERPL-MCNC: 117 MG/DL — HIGH (ref 70–99)
HCT VFR BLD CALC: 39.6 % — SIGNIFICANT CHANGE UP (ref 34.5–45)
HGB BLD-MCNC: 14.1 G/DL — SIGNIFICANT CHANGE UP (ref 11.5–15.5)
IANC: 10.04 K/UL — HIGH (ref 1.5–8.5)
IMM GRANULOCYTES NFR BLD AUTO: 0.4 % — SIGNIFICANT CHANGE UP (ref 0–1.5)
INR BLD: 1.03 RATIO — SIGNIFICANT CHANGE UP (ref 0.88–1.16)
LDH SERPL L TO P-CCNC: 203 U/L — SIGNIFICANT CHANGE UP (ref 135–225)
LYMPHOCYTES # BLD AUTO: 17.7 % — SIGNIFICANT CHANGE UP (ref 13–44)
LYMPHOCYTES # BLD AUTO: 2.32 K/UL — SIGNIFICANT CHANGE UP (ref 1–3.3)
MAGNESIUM SERPL-MCNC: 5.6 MG/DL — HIGH (ref 1.6–2.6)
MAGNESIUM SERPL-MCNC: 5.7 MG/DL — HIGH (ref 1.6–2.6)
MCHC RBC-ENTMCNC: 30.6 PG — SIGNIFICANT CHANGE UP (ref 27–34)
MCHC RBC-ENTMCNC: 35.6 GM/DL — SIGNIFICANT CHANGE UP (ref 32–36)
MCV RBC AUTO: 85.9 FL — SIGNIFICANT CHANGE UP (ref 80–100)
MONOCYTES # BLD AUTO: 0.66 K/UL — SIGNIFICANT CHANGE UP (ref 0–0.9)
MONOCYTES NFR BLD AUTO: 5 % — SIGNIFICANT CHANGE UP (ref 2–14)
NEUTROPHILS # BLD AUTO: 10.04 K/UL — HIGH (ref 1.8–7.4)
NEUTROPHILS NFR BLD AUTO: 76.4 % — SIGNIFICANT CHANGE UP (ref 43–77)
NRBC # BLD: 0 /100 WBCS — SIGNIFICANT CHANGE UP
NRBC # FLD: 0 K/UL — SIGNIFICANT CHANGE UP
PLATELET # BLD AUTO: 271 K/UL — SIGNIFICANT CHANGE UP (ref 150–400)
POTASSIUM SERPL-MCNC: 3.7 MMOL/L — SIGNIFICANT CHANGE UP (ref 3.5–5.3)
POTASSIUM SERPL-SCNC: 3.7 MMOL/L — SIGNIFICANT CHANGE UP (ref 3.5–5.3)
PROT 24H UR-MRATE: 564 MG/24 H — HIGH
PROT SERPL-MCNC: 6.8 G/DL — SIGNIFICANT CHANGE UP (ref 6–8.3)
PROTHROM AB SERPL-ACNC: 11.7 SEC — SIGNIFICANT CHANGE UP (ref 10.6–13.6)
RBC # BLD: 4.61 M/UL — SIGNIFICANT CHANGE UP (ref 3.8–5.2)
RBC # FLD: 12.8 % — SIGNIFICANT CHANGE UP (ref 10.3–14.5)
SODIUM SERPL-SCNC: 130 MMOL/L — LOW (ref 135–145)
T PALLIDUM AB TITR SER: NEGATIVE — SIGNIFICANT CHANGE UP
TOTAL VOLUME - 24 HOUR: 1950 ML — SIGNIFICANT CHANGE UP
URATE SERPL-MCNC: 5.2 MG/DL — SIGNIFICANT CHANGE UP (ref 2.5–7)
URINE CREATININE CALCULATION: 1 G/24 H — SIGNIFICANT CHANGE UP (ref 0.6–1.6)
WBC # BLD: 13.14 K/UL — HIGH (ref 3.8–10.5)
WBC # FLD AUTO: 13.14 K/UL — HIGH (ref 3.8–10.5)

## 2022-01-20 PROCEDURE — 76820 UMBILICAL ARTERY ECHO: CPT | Mod: 26

## 2022-01-20 PROCEDURE — 76815 OB US LIMITED FETUS(S): CPT | Mod: 26

## 2022-01-20 PROCEDURE — 99232 SBSQ HOSP IP/OBS MODERATE 35: CPT | Mod: GC

## 2022-01-20 RX ORDER — MAGNESIUM SULFATE 500 MG/ML
2 VIAL (ML) INJECTION
Qty: 40 | Refills: 0 | Status: DISCONTINUED | OUTPATIENT
Start: 2022-01-20 | End: 2022-01-20

## 2022-01-20 RX ORDER — MORPHINE SULFATE 50 MG/1
2 CAPSULE, EXTENDED RELEASE ORAL ONCE
Refills: 0 | Status: DISCONTINUED | OUTPATIENT
Start: 2022-01-20 | End: 2022-01-20

## 2022-01-20 RX ORDER — LANOLIN ALCOHOL/MO/W.PET/CERES
6 CREAM (GRAM) TOPICAL AT BEDTIME
Refills: 0 | Status: DISCONTINUED | OUTPATIENT
Start: 2022-01-20 | End: 2022-02-07

## 2022-01-20 RX ORDER — LANOLIN ALCOHOL/MO/W.PET/CERES
5 CREAM (GRAM) TOPICAL AT BEDTIME
Refills: 0 | Status: DISCONTINUED | OUTPATIENT
Start: 2022-01-20 | End: 2022-01-20

## 2022-01-20 RX ORDER — HEPARIN SODIUM 5000 [USP'U]/ML
5000 INJECTION INTRAVENOUS; SUBCUTANEOUS EVERY 12 HOURS
Refills: 0 | Status: DISCONTINUED | OUTPATIENT
Start: 2022-01-20 | End: 2022-02-02

## 2022-01-20 RX ADMIN — Medication 6 MILLIGRAM(S): at 03:40

## 2022-01-20 RX ADMIN — Medication 30 MILLIGRAM(S): at 14:49

## 2022-01-20 RX ADMIN — Medication 6 MILLIGRAM(S): at 21:47

## 2022-01-20 RX ADMIN — Medication 50 GM/HR: at 03:25

## 2022-01-20 RX ADMIN — HEPARIN SODIUM 5000 UNIT(S): 5000 INJECTION INTRAVENOUS; SUBCUTANEOUS at 21:47

## 2022-01-20 RX ADMIN — SODIUM CHLORIDE 50 MILLILITER(S): 9 INJECTION, SOLUTION INTRAVENOUS at 07:42

## 2022-01-20 RX ADMIN — Medication 50 GM/HR: at 07:41

## 2022-01-20 NOTE — CHART NOTE - NSCHARTNOTEFT_GEN_A_CORE
After further discussion with partner, patient has stated that if she has severe preeclampsia requiring delivery prior to 24 weeks she would prefer Dilatation and Evacuation. If there is no availability to do a D&E then an induction is reasonable with comfort care only for the fetus.

## 2022-01-20 NOTE — CHART NOTE - NSCHARTNOTEFT_GEN_A_CORE
R4 Chart Note      Patient with stable BP. HELLP labs wnl on repeat at 11p. Will transfer to 4T. Headache improved.  Reg diet, c/w Mag for 24hrs, no fetal monitoring.    Repeat HELLP labs with next Mag level    D/w Dr. Zabrina Barone PGY-4

## 2022-01-20 NOTE — PROGRESS NOTE ADULT - ASSESSMENT
25 yo  at 23.1 wks presenting with severe range blood pressures and proteinuria meeting criteria for sPEC.  Additionally, fetus found to be of a non-viable weight with AEDV on UAD overall indicating poor prognosis. Patient counseled on diagnosis of sPEC and options at this age including, proceeding with pregnancy with understanding of risks of severe prematurity or interruption of pregnancy.  Patient is electing to continue to the pregnancy after adequate counseling.      #sPEC  -magnesium started, to be completed x24 hours for seizure ppx  -HELLP labs q6h  -initial abnormal lab value of P/C= 1.3, all other labs wnl  -BP monitoring  -c/w Procardia 30 XL for stabilization of BPs    #Fetal well being  -defer fetal monitoring and steroids at this time as fetus is not of a viable weight  -daily FH check  -s/p NICU consultation , patient expressed understanding of the risks of severe prematurity  -comfort care in case of delivery at this time  -will reassess monitoring for fetal intervention at 24 wks  -consents signed for classical  section in case of emergent situation    #Maternal well being  -regular diet  -SCDs for DVT ppx    MGreenman PGY3 27 yo  at 23.1 wks presenting with severe range blood pressures and proteinuria meeting criteria for sPEC.  Additionally, fetus found to be of a non-viable weight with AEDV on UAD overall indicating poor prognosis. Patient counseled on diagnosis of sPEC and options at this age including, proceeding with pregnancy with understanding of risks of severe prematurity or interruption of pregnancy.  Patient is electing to continue to the pregnancy after adequate counseling.      #sPEC  -magnesium started, to be completed x24 hours for seizure ppx  -HELLP labs q6h  -initial abnormal lab value of P/C= 1.3, all other labs wnl  -BP monitoring  -c/w Procardia 30 XL for stabilization of BPs    #Fetal well being  -defer fetal monitoring and steroids at this time as fetus is not of a viable weight  -daily FH check  -s/p NICU consultation , patient expressed understanding of the risks of severe prematurity  -comfort care in case of delivery at this time  -will reassess monitoring for fetal intervention at 24 wks  -consents signed for classical  section in case of emergent situation    #Maternal well being  -regular diet  -SCDs for DVT ppx    MGreenman PGY3    MFM Fellow Note     Patient is a 23 1/7 weeks admitted with preeclampsia severe features due to severe range blood pressures and headache. She received 24 hours of magnesium sulfate and continued to Procardia 30mg XL and has remained stable since yesterday.   Sonogram yesterday confirmed FGR with 355g and AEDV. She was counseled by NICU extensively on the long term prognosis.   We again confirmed patients desires and management plan for this pregnancy. At this time, she understands that continuing the pregnancy can lead to worsening preeclampsia which can significantly increased patient's morbidity. She would like to continue expectant management with close maternal monitoring. If there is evidence of worsening preeclampsia including persistent severe range blood pressure not controlled on antihypertensive medications, PEC symptoms, or worsening HELLP syndrome would proceed with deliver of fetus.   At this time, secondary fetal weight we will not initiate fetal monitoring until 24 weeks. SHe understands that with fetal monitoring, may lead to a urgen classical csarean delivery, and outcome for the fetus is overall poor.   If patient requires delivery prior to 24 weeks, we reviewed that optiosn including D&E and induction of labor. If patient opts for induction we reviewed that if the fetus is born with a heart rate, NICU can evaluate fetus an dpatient can choose comfort care versus full rescutiatin. At this time she is undecided o nmode of deliry and comfort care vs full resuctitaion if deliveyr is requierd prior to 24 weeks. She would like to talk to her partner prior            25 yo  at 23.1 wks presenting with severe range blood pressures and proteinuria meeting criteria for sPEC.  Additionally, fetus found to be of a non-viable weight with AEDV on UAD overall indicating poor prognosis. Patient counseled on diagnosis of sPEC and options at this age including, proceeding with pregnancy with understanding of risks of severe prematurity or interruption of pregnancy.  Patient is electing to continue to the pregnancy after adequate counseling.      #sPEC  -magnesium started, to be completed x24 hours for seizure ppx  -HELLP labs q6h  -initial abnormal lab value of P/C= 1.3, all other labs wnl  -BP monitoring  -c/w Procardia 30 XL for stabilization of BPs    #Fetal well being  -defer fetal monitoring and steroids at this time as fetus is not of a viable weight  -daily FH check  -s/p NICU consultation , patient expressed understanding of the risks of severe prematurity  -comfort care in case of delivery at this time  -will reassess monitoring for fetal intervention at 24 wks  -consents signed for classical  section in case of emergent situation    #Maternal well being  -regular diet  -SCDs for DVT ppx    MGreenman PGY3    MFM Fellow Note     Patient is a 23 1/7 weeks admitted with preeclampsia severe features due to severe range blood pressures and headache. She received 24 hours of magnesium sulfate and continued to Procardia 30mg XL and has remained stable since yesterday.   Sonogram yesterday confirmed FGR with 355g and AEDV. She was counseled by NICU extensively on the long term prognosis.   We again confirmed patient's desires and management plan for this pregnancy. At this time, she understands that continuing the pregnancy can lead to worsening preeclampsia which can significantly increase patient's morbidity. She would like to continue expectant management with close maternal monitoring. If there is evidence of worsening preeclampsia including persistent severe range blood pressure not controlled on antihypertensive medications, PEC symptoms, or worsening HELLP syndrome would proceed with deliver of fetus.   At this time, secondary fetal weight we will not initiate fetal monitoring until 24 weeks. SHe understands that with fetal monitoring, may lead to a urgent classical cesareabdelivery, and outcome for the fetus is overall poor.   If patient requires delivery prior to 24 weeks, we reviewed that options include D&E and induction of labor. If patient opts for induction we reviewed that if the fetus is born with a heart rate, NICU can evaluate fetus and patient can choose comfort care versus full resuscitation. At this time she is undecided on mode of delivery and comfort care vs full resuscitation if delivery is required prior to 24 weeks. She would like to talk to her partner prior to making decision.   Will continue to monitor patient closely for signs and symtoms of worsening preeclampsia with blood pressure and serial HELLP labs    Patient seen with and counseled with Dr. Adamson (M attending)     Silvestre Horta MD   M Fellow

## 2022-01-20 NOTE — PROGRESS NOTE ADULT - SUBJECTIVE AND OBJECTIVE BOX
R3 antepartum note    Interval events: no severe range BPS overnight    Patient seen and examined at bedside, no acute overnight events. No acute complaints. Patient endorses good fetal movement. Patient is ambulating and tolerating regular diet. Denies CP, SOB, N/V, fevers, chills, or any other concerns.    Vital Signs Last 24 Hours  T(C): 36.8 (01-20-22 @ 05:49), Max: 37 (01-20-22 @ 03:29)  HR: 100 (01-20-22 @ 07:55) (89 - 135)  BP: 124/83 (01-20-22 @ 07:55) (117/72 - 171/99)  RR: 18 (01-20-22 @ 05:49) (1 - 18)  SpO2: 98% (01-20-22 @ 07:54) (88% - 100%)    I&O's Summary    19 Jan 2022 07:01  -  20 Jan 2022 07:00  --------------------------------------------------------  IN: 1100 mL / OUT: 1670 mL / NET: -570 mL    Physical Exam:  General: NAD  CV: RR  Lungs: breathing comfortably on RA  Abdomen: soft, gravid, non-tender  Ext: no pain or swelling    Labs:             14.1   13.14<H> )-----------( 271      ( 01-20 @ 07:02 )             39.6                14.5   14.28<H> )-----------( 298      ( 01-19 @ 23:12 )             42.3                14.0   12.03<H> )-----------( 267      ( 01-19 @ 17:26 )             39.4                15.2   14.60<H> )-----------( 291      ( 01-19 @ 08:59 )             42.7         MEDICATIONS  (STANDING):  lactated ringers. 1000 milliLiter(s) (50 mL/Hr) IV Continuous <Continuous>  magnesium sulfate Infusion 2 Gm/Hr (50 mL/Hr) IV Continuous <Continuous>  melatonin 6 milliGRAM(s) Oral at bedtime  NIFEdipine XL 30 milliGRAM(s) Oral daily

## 2022-01-21 ENCOUNTER — ASOB RESULT (OUTPATIENT)
Age: 27
End: 2022-01-21

## 2022-01-21 ENCOUNTER — APPOINTMENT (OUTPATIENT)
Dept: ANTEPARTUM | Facility: CLINIC | Age: 27
End: 2022-01-21

## 2022-01-21 PROCEDURE — 76821 MIDDLE CEREBRAL ARTERY ECHO: CPT | Mod: 26

## 2022-01-21 PROCEDURE — 76815 OB US LIMITED FETUS(S): CPT | Mod: 26

## 2022-01-21 PROCEDURE — 76820 UMBILICAL ARTERY ECHO: CPT | Mod: 26

## 2022-01-21 RX ADMIN — Medication 6 MILLIGRAM(S): at 22:13

## 2022-01-21 RX ADMIN — Medication 30 MILLIGRAM(S): at 14:20

## 2022-01-21 RX ADMIN — HEPARIN SODIUM 5000 UNIT(S): 5000 INJECTION INTRAVENOUS; SUBCUTANEOUS at 09:39

## 2022-01-21 RX ADMIN — Medication 1 TABLET(S): at 09:51

## 2022-01-21 RX ADMIN — HEPARIN SODIUM 5000 UNIT(S): 5000 INJECTION INTRAVENOUS; SUBCUTANEOUS at 22:14

## 2022-01-21 NOTE — PROGRESS NOTE ADULT - SUBJECTIVE AND OBJECTIVE BOX
R3 Antepartum Note, HD#3    Interval events: Patient seen and examined at bedside, no acute overnight events. No acute complaints. Pt reports minimal FM, denies LOF, VB, ctx, HA, epigastric pain, blurred vision, CP, SOB, N/V, fevers, or chills.    Vital Signs Last 24 Hours  T(C): 37.2 (01-21-22 @ 05:24), Max: 37.2 (01-21-22 @ 05:24)  HR: 98 (01-21-22 @ 05:24) (89 - 104)  BP: 122/79 (01-21-22 @ 05:24) (120/73 - 135/90)  RR: 17 (01-21-22 @ 05:24) (16 - 19)  SpO2: 98% (01-21-22 @ 05:24) (98% - 100%)      Physical Exam:  General: NAD  Abdomen: Soft, non-tender, gravid  Ext: No pain or swelling    NST reactive overnight    Labs:             14.1   13.14 )-----------( 271      ( 01-20 @ 07:02 )             39.6     01-20 @ 07:02    130  |  98  |  7   ----------------------------<  117  3.7   |  21  |  0.49    Ca    6.9      01-20 @ 07:02  Mg     5.60     01-20 @ 07:02    TPro  6.8  /  Alb  3.7  /  TBili  0.4  /  DBili  x   /  AST  22  /  ALT  19  /  AlkPhos  84  01-20 @ 07:02    PT/INR - ( 01-20 @ 07:02 )   PT: 11.7 sec;   INR: 1.03 ratio    PTT - ( 01-20 @ 07:02 )  PTT:30.7 sec    Uric Acid: (01-20 @ 07:02)  5.2      Fibrinogen: (01-20 @ 07:02)  499      LDH: (01-20 @ 07:02)  203        MEDICATIONS  (STANDING):  heparin   Injectable 5000 Unit(s) SubCutaneous every 12 hours  melatonin 6 milliGRAM(s) Oral at bedtime  NIFEdipine XL 30 milliGRAM(s) Oral daily    MEDICATIONS  (PRN):

## 2022-01-21 NOTE — PROGRESS NOTE ADULT - ASSESSMENT
25 yo  at 23w2d admitted with severe range blood pressures and proteinuria meeting criteria for sPEC.  Additionally, fetus found to be of a non-viable weight with AEDV on UAD overall indicating poor prognosis. Patient counseled on diagnosis of sPEC and options at this age including, proceeding with pregnancy with understanding of risks of severe prematurity or interruption of pregnancy.  Patient is electing to continue to the pregnancy after adequate counseling & scheduled for repeat ATU scan today.    #sPEC  -s/p magnesium x24 hours for seizure ppx  -HELLP labs prn  -initial abnormal lab value of P/C= 1.3, all other labs wnl  -BP monitoring, wnl overnight  -c/w Procardia 30 XL for stabilization of BPs    #Fetal well being  -defer fetal monitoring and steroids at this time as fetus is not of a viable weight  -daily FH check  -s/p NICU consultation , patient expressed understanding of the risks of severe prematurity  -comfort care in case of delivery at this time  -consents signed for classical  section on admission in case of emergent situation    #Maternal well being  -regular diet  -SCDs for DVT ppx    Ban Mao MD  OBGYN PGY3  25 yo  at 23w2d admitted with severe range blood pressures and proteinuria meeting criteria for sPEC.  Additionally, fetus found to be of a non-viable weight with AEDV on UAD overall indicating poor prognosis. Patient counseled on diagnosis of sPEC and options at this age including, proceeding with pregnancy with understanding of risks of severe prematurity or interruption of pregnancy.  Patient is electing to continue to the pregnancy after adequate counseling & scheduled for repeat ATU scan today.    #sPEC  -s/p magnesium x24 hours for seizure ppx  -HELLP labs prn  -initial abnormal lab value of P/C= 1.3, all other labs wnl  -BP monitoring, wnl overnight  -c/w Procardia 30 XL for stabilization of BPs    #Fetal well being  -defer fetal monitoring and steroids at this time as fetus is not of a viable weight  -daily FH check  -s/p NICU consultation , patient expressed understanding of the risks of severe prematurity  -comfort care in case of delivery at this time  -consents signed for classical  section on admission in case of emergent situation    #Maternal well being  -regular diet  -SCDs for DVT ppx      MFM Fellow Note     Patient is a 23 2/7 weeks admitted with preeclampsia severe features due to severe range blood pressures and headache. She received 24 hours of magnesium sulfate and continued  Procardia 30mg XL and has remained stable.   Sonogram confirmed FGR with 355g and AEDV. She was counseled by NICU extensively on the long term prognosis.   We again confirmed patient's desires and management plan for this pregnancy. If she requires delivery prior to 24 weeks, she would like D&E. If a provider is not available to perform D&E,  then patient is will undergo induction with comfort care for the . After 24 weeks, patient will like to discuss option of betamethasone and at what point she would like to initiate fetal monitoring.  She understands that by continuing expectant management there is a risk of worsening preeclampsia that can increase maternal morbidity.   We will continue to monitor blood pressure and preeclampsia symptom closely. Will consider delivery if patient's blood pressure is unable to controlled, develops symptoms or there is abnormalities in HELLP labs.       Patient seen with and counseled with Dr. Longoria (M attending)     Silvestre Horta MD   M Fellow

## 2022-01-22 LAB
ALBUMIN SERPL ELPH-MCNC: 3.5 G/DL — SIGNIFICANT CHANGE UP (ref 3.3–5)
ALP SERPL-CCNC: 82 U/L — SIGNIFICANT CHANGE UP (ref 40–120)
ALT FLD-CCNC: 23 U/L — SIGNIFICANT CHANGE UP (ref 4–33)
ANION GAP SERPL CALC-SCNC: 16 MMOL/L — HIGH (ref 7–14)
APTT BLD: 31.9 SEC — SIGNIFICANT CHANGE UP (ref 27–36.3)
AST SERPL-CCNC: 24 U/L — SIGNIFICANT CHANGE UP (ref 4–32)
BASOPHILS # BLD AUTO: 0.06 K/UL — SIGNIFICANT CHANGE UP (ref 0–0.2)
BASOPHILS NFR BLD AUTO: 0.5 % — SIGNIFICANT CHANGE UP (ref 0–2)
BILIRUB SERPL-MCNC: 0.3 MG/DL — SIGNIFICANT CHANGE UP (ref 0.2–1.2)
BUN SERPL-MCNC: 9 MG/DL — SIGNIFICANT CHANGE UP (ref 7–23)
CALCIUM SERPL-MCNC: 8.8 MG/DL — SIGNIFICANT CHANGE UP (ref 8.4–10.5)
CHLORIDE SERPL-SCNC: 102 MMOL/L — SIGNIFICANT CHANGE UP (ref 98–107)
CO2 SERPL-SCNC: 18 MMOL/L — LOW (ref 22–31)
CREAT SERPL-MCNC: 0.52 MG/DL — SIGNIFICANT CHANGE UP (ref 0.5–1.3)
EOSINOPHIL # BLD AUTO: 0.21 K/UL — SIGNIFICANT CHANGE UP (ref 0–0.5)
EOSINOPHIL NFR BLD AUTO: 1.8 % — SIGNIFICANT CHANGE UP (ref 0–6)
FIBRINOGEN PPP-MCNC: 571 MG/DL — HIGH (ref 290–520)
GLUCOSE SERPL-MCNC: 86 MG/DL — SIGNIFICANT CHANGE UP (ref 70–99)
HCT VFR BLD CALC: 40.4 % — SIGNIFICANT CHANGE UP (ref 34.5–45)
HGB BLD-MCNC: 14.1 G/DL — SIGNIFICANT CHANGE UP (ref 11.5–15.5)
IANC: 6.2 K/UL — SIGNIFICANT CHANGE UP (ref 1.5–8.5)
IMM GRANULOCYTES NFR BLD AUTO: 0.3 % — SIGNIFICANT CHANGE UP (ref 0–1.5)
INR BLD: 1.01 RATIO — SIGNIFICANT CHANGE UP (ref 0.88–1.16)
LDH SERPL L TO P-CCNC: 174 U/L — SIGNIFICANT CHANGE UP (ref 135–225)
LYMPHOCYTES # BLD AUTO: 3.89 K/UL — HIGH (ref 1–3.3)
LYMPHOCYTES # BLD AUTO: 34.2 % — SIGNIFICANT CHANGE UP (ref 13–44)
MCHC RBC-ENTMCNC: 29.9 PG — SIGNIFICANT CHANGE UP (ref 27–34)
MCHC RBC-ENTMCNC: 34.9 GM/DL — SIGNIFICANT CHANGE UP (ref 32–36)
MCV RBC AUTO: 85.8 FL — SIGNIFICANT CHANGE UP (ref 80–100)
MONOCYTES # BLD AUTO: 0.99 K/UL — HIGH (ref 0–0.9)
MONOCYTES NFR BLD AUTO: 8.7 % — SIGNIFICANT CHANGE UP (ref 2–14)
NEUTROPHILS # BLD AUTO: 6.2 K/UL — SIGNIFICANT CHANGE UP (ref 1.8–7.4)
NEUTROPHILS NFR BLD AUTO: 54.5 % — SIGNIFICANT CHANGE UP (ref 43–77)
NRBC # BLD: 0 /100 WBCS — SIGNIFICANT CHANGE UP
NRBC # FLD: 0 K/UL — SIGNIFICANT CHANGE UP
PLATELET # BLD AUTO: 257 K/UL — SIGNIFICANT CHANGE UP (ref 150–400)
POTASSIUM SERPL-MCNC: 3.7 MMOL/L — SIGNIFICANT CHANGE UP (ref 3.5–5.3)
POTASSIUM SERPL-SCNC: 3.7 MMOL/L — SIGNIFICANT CHANGE UP (ref 3.5–5.3)
PROT SERPL-MCNC: 6.6 G/DL — SIGNIFICANT CHANGE UP (ref 6–8.3)
PROTHROM AB SERPL-ACNC: 11.6 SEC — SIGNIFICANT CHANGE UP (ref 10.6–13.6)
RBC # BLD: 4.71 M/UL — SIGNIFICANT CHANGE UP (ref 3.8–5.2)
RBC # FLD: 12.4 % — SIGNIFICANT CHANGE UP (ref 10.3–14.5)
SODIUM SERPL-SCNC: 136 MMOL/L — SIGNIFICANT CHANGE UP (ref 135–145)
URATE SERPL-MCNC: 4.2 MG/DL — SIGNIFICANT CHANGE UP (ref 2.5–7)
WBC # BLD: 11.38 K/UL — HIGH (ref 3.8–10.5)
WBC # FLD AUTO: 11.38 K/UL — HIGH (ref 3.8–10.5)

## 2022-01-22 PROCEDURE — 99232 SBSQ HOSP IP/OBS MODERATE 35: CPT | Mod: GC

## 2022-01-22 RX ORDER — NIFEDIPINE 30 MG
60 TABLET, EXTENDED RELEASE 24 HR ORAL DAILY
Refills: 0 | Status: DISCONTINUED | OUTPATIENT
Start: 2022-01-22 | End: 2022-01-24

## 2022-01-22 RX ADMIN — HEPARIN SODIUM 5000 UNIT(S): 5000 INJECTION INTRAVENOUS; SUBCUTANEOUS at 22:53

## 2022-01-22 RX ADMIN — Medication 1 TABLET(S): at 13:29

## 2022-01-22 RX ADMIN — Medication 6 MILLIGRAM(S): at 22:52

## 2022-01-22 RX ADMIN — Medication 60 MILLIGRAM(S): at 13:29

## 2022-01-22 NOTE — PROGRESS NOTE ADULT - SUBJECTIVE AND OBJECTIVE BOX
Patient seen and examined at bedside, no acute overnight events. No acute complaints. Patient endorses good fetal movement. Patient is ambulating and tolerating regular diet. Denies CP, SOB, N/V, fevers, chills, or any other concerns.    Vital Signs Last 24 Hours  T(C): 36.7 (01-21-22 @ 21:35), Max: 37.2 (01-21-22 @ 05:24)  HR: 79 (01-22-22 @ 00:56) (30 - 98)  BP: 143/77 (01-22-22 @ 00:56) (122/79 - 143/77)  RR: 19 (01-21-22 @ 21:35) (17 - 19)  SpO2: 100% (01-22-22 @ 00:56) (98% - 100%)    I&O's Summary    20 Jan 2022 07:01  -  21 Jan 2022 07:00  --------------------------------------------------------  IN: 400 mL / OUT: 650 mL / NET: -250 mL        Physical Exam:  General: NAD  CV: RR  Lungs: breathing comfortably on RA  Abdomen: soft, gravid, non-tender  Ext: no pain or swelling    Labs:             14.1   13.14<H> )-----------( 271      ( 01-20 @ 07:02 )             39.6                14.5   14.28<H> )-----------( 298      ( 01-19 @ 23:12 )             42.3                14.0   12.03<H> )-----------( 267      ( 01-19 @ 17:26 )             39.4                15.2   14.60<H> )-----------( 291      ( 01-19 @ 08:59 )             42.7         MEDICATIONS  (STANDING):  heparin   Injectable 5000 Unit(s) SubCutaneous every 12 hours  melatonin 6 milliGRAM(s) Oral at bedtime  NIFEdipine XL 30 milliGRAM(s) Oral daily  prenatal multivitamin 1 Tablet(s) Oral daily    MEDICATIONS  (PRN):

## 2022-01-22 NOTE — PROGRESS NOTE ADULT - ASSESSMENT
27 yo  at 23w3d admitted with severe range blood pressures and proteinuria meeting criteria for sPEC.  Additionally, fetus found to be of a non-viable weight with AEDV on UAD overall indicating poor prognosis. Patient counseled on diagnosis of sPEC and options at this age including, proceeding with pregnancy with understanding of risks of severe prematurity or interruption of pregnancy.  Patient is electing to continue to the pregnancy after adequate counseling.     #sPEC  -s/p magnesium x24 hours for seizure ppx  -HELLP labs PRN  -initial abnormal lab value of P/C= 1.3, all other labs wnl  -BP monitoring, wnl overnight  -c/w Procardia 30 XL for stabilization of BPs    #Fetal well being  -defer fetal monitoring and steroids at this time as fetus is not of a viable weight  -daily FH check  -s/p NICU consultation , patient expressed understanding of the risks of severe prematurity  -comfort care in case of delivery at this time  -consents signed for classical  section on admission in case of emergent situation    #Maternal well being  -regular diet  -HSQ, SCDs for DVT ppx    Lyn, PGY-3

## 2022-01-22 NOTE — CHART NOTE - NSCHARTNOTEFT_GEN_A_CORE
R3 Note    Patient seen at bedside, discussed with patient new finding of elevated BPs today.  Blood pressures well controlled since admission, typically 120-130/80s, however as of this am now 140-150s/50-90s, an acute change from prior blood pressures.  Patient with no other symptoms at this time- denies headache, blurry vision, SOB, RUQ pain.  HELLP labs from this AM stable, slight hemoconcentration but stable LFTs, Cr, Plts. R3 Note    Patient seen at bedside, discussed with patient new finding of elevated BPs today.  Blood pressures well controlled since admission, typically 120-130/80s, however as of this am now 140-150s/50-90s, an acute change from prior blood pressures.  Patient with no other symptoms at this time- denies headache, blurry vision, SOB, RUQ pain.  HELLP labs from this AM stable, slight hemoconcentration but stable LFTs, Cr, Plts.  Given new elevated blood pressures, sat with patient at b R3 Note    Patient seen at bedside, discussed with patient new finding of elevated BPs today.  Blood pressures well controlled since admission, typically 120-130/80s, however as of this am now 140-150s/50-90s, an acute change from prior blood pressures.  Patient with no other symptoms at this time- denies headache, blurry vision, SOB, RUQ pain.  HELLP labs from this AM stable, slight hemoconcentration but stable LFTs, Cr, Plts.  Given new elevated blood pressures, sat with patient at bedside to discuss prognosis of disease. R3 Note    Patient seen at bedside, discussed with patient new finding of elevated BPs today.  Blood pressures well controlled since admission, typically 120-130/80s, however as of this am now 140-150s/50-90s, an acute change from prior blood pressures.  Patient with no other symptoms at this time- denies headache, blurry vision, SOB, RUQ pain.  HELLP labs from this AM stable, slight hemoconcentration but stable LFTs, Cr, Plts.  Given new elevated blood pressures, sat with patient at bedside to discuss prognosis of disease.  Elevated blood pressures a sign of worsening disease.  Explained to patient that this is a poor prognosis for the pregnancy as preeclampsia is becoming worse and she is currently at a periviable age.  Patient offered options at this time:    1. Interruption of the pregnancy- patient offered D&E vs. IOL w/ comfort care until 24 wks gestation.  Patient expressed understanding that this is the limit for termination and following 24 wks resuscitation will be initiated.    2. Continuation of pregnancy with uptitration of medication- patient counseled that increased BP medication temporarily will stabilize BPs however essential will mask PEC and is not a cure.  Patient also informed that antihypertensives will lower BPs and decrease risk of stroke however will not halt disease process and risk of damage to liver, kidneys, and risk of seizure is not lowered.  Fetus at this time of a very small size with AEDV, concern that with worsening PEC fetus will continue to be growth restricted and possible worsening of umbilical artery blood flow.  Although 24 wks marks viability, this fetus will require substantial growth which may take weeks and which point disease could severely worsen.      Patient mother and boyfriend present during the discussion.  The patient and family were given adequate time to answer questions.  It was emphasized that this disease will not improve R3 Note    Patient seen at bedside, discussed with patient new finding of elevated BPs today.  Blood pressures well controlled since admission, typically 120-130/80s, however as of this am now 140-150s/50-90s, an acute change from prior blood pressures.  Patient with no other symptoms at this time- denies headache, blurry vision, SOB, RUQ pain.  HELLP labs from this AM stable, slight hemoconcentration but stable LFTs, Cr, Plts.  Given new elevated blood pressures, sat with patient at bedside to discuss prognosis of disease.  Elevated blood pressures a sign of worsening disease.  Explained to patient that this is a poor prognosis for the pregnancy as preeclampsia is becoming worse and she is currently at a periviable age.  Patient offered options at this time:    1. Interruption of the pregnancy- patient offered D&E vs. IOL w/ comfort care until 24 wks gestation.  Patient expressed understanding that this is the limit for termination and following 24 wks resuscitation will be initiated.    2. Continuation of pregnancy with uptitration of medication- patient counseled that increased BP medication temporarily will stabilize BPs however essential will mask PEC and is not a cure.  Patient also informed that antihypertensives will lower BPs and decrease risk of stroke however will not halt disease process and risk of damage to liver, kidneys, and risk of seizure is not lowered.  Fetus at this time of a very small size with AEDV, concern that with worsening PEC fetus will continue to be growth restricted and possible worsening of umbilical artery blood flow.  Although 24 wks marks viability, this fetus will require substantial growth which may take weeks and which point disease could severely worsen.      Patient mother and boyfriend present during the discussion.  The patient and family were given adequate time to answer questions.  It was emphasized that this disease will not improve and the natural course is that it will progress.  The patient and her family expressed good understanding of preeclampsia.  It is her desire at this time to continue the pregnancy.  She would like to uptitrate medication and understands this is a temporary solution.  She also expressed understanding that worsening labs and symptoms would prompt delivery.  She accepts the risk of a possible classical  delivery.  We will discuss betamethasone and delivery for fetal indications at 24 wks.     d/w Dr. Juan Diego Hansen PGY3

## 2022-01-23 LAB
ALBUMIN SERPL ELPH-MCNC: 3.8 G/DL — SIGNIFICANT CHANGE UP (ref 3.3–5)
ALP SERPL-CCNC: 91 U/L — SIGNIFICANT CHANGE UP (ref 40–120)
ALT FLD-CCNC: 34 U/L — HIGH (ref 4–33)
ANION GAP SERPL CALC-SCNC: 15 MMOL/L — HIGH (ref 7–14)
AST SERPL-CCNC: 33 U/L — HIGH (ref 4–32)
BASOPHILS # BLD AUTO: 0.05 K/UL — SIGNIFICANT CHANGE UP (ref 0–0.2)
BASOPHILS NFR BLD AUTO: 0.5 % — SIGNIFICANT CHANGE UP (ref 0–2)
BILIRUB SERPL-MCNC: 0.3 MG/DL — SIGNIFICANT CHANGE UP (ref 0.2–1.2)
BUN SERPL-MCNC: 8 MG/DL — SIGNIFICANT CHANGE UP (ref 7–23)
CALCIUM SERPL-MCNC: 9.4 MG/DL — SIGNIFICANT CHANGE UP (ref 8.4–10.5)
CHLORIDE SERPL-SCNC: 99 MMOL/L — SIGNIFICANT CHANGE UP (ref 98–107)
CO2 SERPL-SCNC: 20 MMOL/L — LOW (ref 22–31)
CREAT SERPL-MCNC: 0.56 MG/DL — SIGNIFICANT CHANGE UP (ref 0.5–1.3)
EOSINOPHIL # BLD AUTO: 0.09 K/UL — SIGNIFICANT CHANGE UP (ref 0–0.5)
EOSINOPHIL NFR BLD AUTO: 0.9 % — SIGNIFICANT CHANGE UP (ref 0–6)
GLUCOSE SERPL-MCNC: 193 MG/DL — HIGH (ref 70–99)
HCT VFR BLD CALC: 40.9 % — SIGNIFICANT CHANGE UP (ref 34.5–45)
HGB BLD-MCNC: 14.2 G/DL — SIGNIFICANT CHANGE UP (ref 11.5–15.5)
IANC: 7.44 K/UL — SIGNIFICANT CHANGE UP (ref 1.5–8.5)
IMM GRANULOCYTES NFR BLD AUTO: 0.3 % — SIGNIFICANT CHANGE UP (ref 0–1.5)
INR BLD: 1.04 RATIO — SIGNIFICANT CHANGE UP (ref 0.88–1.16)
LDH SERPL L TO P-CCNC: 213 U/L — SIGNIFICANT CHANGE UP (ref 135–225)
LYMPHOCYTES # BLD AUTO: 2.37 K/UL — SIGNIFICANT CHANGE UP (ref 1–3.3)
LYMPHOCYTES # BLD AUTO: 22.4 % — SIGNIFICANT CHANGE UP (ref 13–44)
MCHC RBC-ENTMCNC: 30 PG — SIGNIFICANT CHANGE UP (ref 27–34)
MCHC RBC-ENTMCNC: 34.7 GM/DL — SIGNIFICANT CHANGE UP (ref 32–36)
MCV RBC AUTO: 86.3 FL — SIGNIFICANT CHANGE UP (ref 80–100)
MONOCYTES # BLD AUTO: 0.59 K/UL — SIGNIFICANT CHANGE UP (ref 0–0.9)
MONOCYTES NFR BLD AUTO: 5.6 % — SIGNIFICANT CHANGE UP (ref 2–14)
NEUTROPHILS # BLD AUTO: 7.44 K/UL — HIGH (ref 1.8–7.4)
NEUTROPHILS NFR BLD AUTO: 70.3 % — SIGNIFICANT CHANGE UP (ref 43–77)
NRBC # BLD: 0 /100 WBCS — SIGNIFICANT CHANGE UP
NRBC # FLD: 0 K/UL — SIGNIFICANT CHANGE UP
PLATELET # BLD AUTO: 244 K/UL — SIGNIFICANT CHANGE UP (ref 150–400)
POTASSIUM SERPL-MCNC: 3.8 MMOL/L — SIGNIFICANT CHANGE UP (ref 3.5–5.3)
POTASSIUM SERPL-SCNC: 3.8 MMOL/L — SIGNIFICANT CHANGE UP (ref 3.5–5.3)
PROT SERPL-MCNC: 6.8 G/DL — SIGNIFICANT CHANGE UP (ref 6–8.3)
PROTHROM AB SERPL-ACNC: 11.9 SEC — SIGNIFICANT CHANGE UP (ref 10.6–13.6)
RBC # BLD: 4.74 M/UL — SIGNIFICANT CHANGE UP (ref 3.8–5.2)
RBC # FLD: 12.4 % — SIGNIFICANT CHANGE UP (ref 10.3–14.5)
SODIUM SERPL-SCNC: 134 MMOL/L — LOW (ref 135–145)
WBC # BLD: 10.57 K/UL — HIGH (ref 3.8–10.5)
WBC # FLD AUTO: 10.57 K/UL — HIGH (ref 3.8–10.5)

## 2022-01-23 PROCEDURE — 99232 SBSQ HOSP IP/OBS MODERATE 35: CPT | Mod: GC

## 2022-01-23 RX ADMIN — HEPARIN SODIUM 5000 UNIT(S): 5000 INJECTION INTRAVENOUS; SUBCUTANEOUS at 22:53

## 2022-01-23 RX ADMIN — Medication 1 TABLET(S): at 15:12

## 2022-01-23 RX ADMIN — Medication 60 MILLIGRAM(S): at 15:12

## 2022-01-23 RX ADMIN — HEPARIN SODIUM 5000 UNIT(S): 5000 INJECTION INTRAVENOUS; SUBCUTANEOUS at 10:43

## 2022-01-23 NOTE — PROGRESS NOTE ADULT - SUBJECTIVE AND OBJECTIVE BOX
Patient seen and examined at bedside, no acute overnight events. No acute complaints. Patient endorses good fetal movement. Patient is ambulating and tolerating regular diet. Denies CP, SOB, N/V, fevers, chills, or any other concerns.    Vital Signs Last 24 Hours  T(C): 36.8 (01-23-22 @ 01:23), Max: 37.2 (01-22-22 @ 17:32)  HR: 107 (01-23-22 @ 01:23) (85 - 111)  BP: 142/93 (01-23-22 @ 01:23) (133/86 - 158/105)  RR: 18 (01-23-22 @ 01:23) (16 - 19)  SpO2: 100% (01-23-22 @ 01:23) (97% - 100%)    I&O's Summary      Physical Exam:  General: NAD  CV: RR  Lungs: breathing comfortably on RA  Abdomen: soft, gravid, non-tender  Ext: no pain or swelling    Labs:             14.1   11.38<H> )-----------( 257      ( 01-22 @ 07:07 )             40.4                14.1   13.14<H> )-----------( 271      ( 01-20 @ 07:02 )             39.6                14.5   14.28<H> )-----------( 298      ( 01-19 @ 23:12 )             42.3                14.0   12.03<H> )-----------( 267      ( 01-19 @ 17:26 )             39.4                15.2   14.60<H> )-----------( 291      ( 01-19 @ 08:59 )             42.7         MEDICATIONS  (STANDING):  heparin   Injectable 5000 Unit(s) SubCutaneous every 12 hours  melatonin 6 milliGRAM(s) Oral at bedtime  NIFEdipine XL 60 milliGRAM(s) Oral daily  prenatal multivitamin 1 Tablet(s) Oral daily    MEDICATIONS  (PRN):

## 2022-01-23 NOTE — PROGRESS NOTE ADULT - ASSESSMENT
25 yo  at 23w4d admitted with severe range blood pressures and proteinuria meeting criteria for sPEC.  Additionally, fetus found to be of a non-viable weight with AEDV on UAD overall indicating poor prognosis. Patient counseled on diagnosis of sPEC and options at this age including, proceeding with pregnancy with understanding of risks of severe prematurity or interruption of pregnancy.  Patient is electing to continue to the pregnancy after adequate counseling.     #sPEC  -s/p magnesium x24 hours for seizure ppx  -HELLP labs q3D, AM labs pending   -initial abnormal lab value of P/C= 1.3, all other labs wnl  -BP monitoring, wnl overnight  -c/w Procardia 60 XL for stabilization of BPs    #Fetal well being  -defer fetal monitoring and steroids at this time as fetus is not of a viable weight  -daily FH check  -s/p NICU consultation , patient expressed understanding of the risks of severe prematurity  -comfort care in case of delivery at this time  -consents signed for classical  section on admission in case of emergent situation    #Maternal well being  -regular diet  -HSQ, SCDs for DVT ppx    Lyn, PGY-3   25 yo  at 23w4d admitted with severe range blood pressures and proteinuria meeting criteria for sPEC.  Additionally, fetus found to be of a non-viable weight with AEDV on UAD overall indicating poor prognosis. Patient counseled on diagnosis of sPEC and options at this age including, proceeding with pregnancy with understanding of risks of severe prematurity or interruption of pregnancy.  Patient is electing to continue to the pregnancy after adequate counseling.     #sPEC  -s/p magnesium x24 hours for seizure ppx  -HELLP labs q3D, AM labs pending   -initial abnormal lab value of P/C= 1.3, all other labs wnl  -BP monitoring, wnl overnight  -c/w Procardia 60 XL for stabilization of BPs    #Fetal well being  -defer fetal monitoring and steroids at this time as fetus is not of a viable weight  -daily FH check  -s/p NICU consultation , patient expressed understanding of the risks of severe prematurity  -comfort care in case of delivery at this time  -consents signed for classical  section on admission in case of emergent situation    #Maternal well being  -regular diet  -HSQ, SCDs for DVT ppx    Lyn, PGY-3        MFM FELLOW ADDENDUM:   25 yo  at 23w4d with sPEC and fetal IUGR with AEDV. Patient has been extensively counseled on outcomes and poor fetal prognosis. Patient understanding of risks and currently desires to continue the pregnancy. For daily labs (today pending) until 24 weeks' gestation. BPs now well-controlled on Nifed 60mg XL daily increased yesterday. Discussed with patient at length that should her BPs increase on current regimen and require uptitration, or should she manifest any lab abnormalities indicative of end organ damage, that delivery would be recommended at that time given maternal risk. Should patient remain stable at 24 weeks, steroids will be offered and fetal monitoring will be discussed with patient with the understanding that this might require a classical  delivery with implications for future pregnancies.   Patient and mother who was present in her room understanding of all the above.     FELECIA Amaya Fellow   Seen and d/w FELECIA Martin Attending

## 2022-01-24 ENCOUNTER — ASOB RESULT (OUTPATIENT)
Age: 27
End: 2022-01-24

## 2022-01-24 ENCOUNTER — APPOINTMENT (OUTPATIENT)
Dept: ANTEPARTUM | Facility: HOSPITAL | Age: 27
End: 2022-01-24

## 2022-01-24 LAB
ALBUMIN SERPL ELPH-MCNC: 4 G/DL — SIGNIFICANT CHANGE UP (ref 3.3–5)
ALP SERPL-CCNC: 95 U/L — SIGNIFICANT CHANGE UP (ref 40–120)
ALT FLD-CCNC: 31 U/L — SIGNIFICANT CHANGE UP (ref 4–33)
ANION GAP SERPL CALC-SCNC: 14 MMOL/L — SIGNIFICANT CHANGE UP (ref 7–14)
APTT BLD: 32.8 SEC — SIGNIFICANT CHANGE UP (ref 27–36.3)
AST SERPL-CCNC: 26 U/L — SIGNIFICANT CHANGE UP (ref 4–32)
BASOPHILS # BLD AUTO: 0.07 K/UL — SIGNIFICANT CHANGE UP (ref 0–0.2)
BASOPHILS NFR BLD AUTO: 0.5 % — SIGNIFICANT CHANGE UP (ref 0–2)
BILIRUB SERPL-MCNC: 0.3 MG/DL — SIGNIFICANT CHANGE UP (ref 0.2–1.2)
BUN SERPL-MCNC: 10 MG/DL — SIGNIFICANT CHANGE UP (ref 7–23)
CALCIUM SERPL-MCNC: 9.3 MG/DL — SIGNIFICANT CHANGE UP (ref 8.4–10.5)
CHLORIDE SERPL-SCNC: 100 MMOL/L — SIGNIFICANT CHANGE UP (ref 98–107)
CO2 SERPL-SCNC: 20 MMOL/L — LOW (ref 22–31)
CREAT SERPL-MCNC: 0.54 MG/DL — SIGNIFICANT CHANGE UP (ref 0.5–1.3)
EOSINOPHIL # BLD AUTO: 0.19 K/UL — SIGNIFICANT CHANGE UP (ref 0–0.5)
EOSINOPHIL NFR BLD AUTO: 1.4 % — SIGNIFICANT CHANGE UP (ref 0–6)
FIBRINOGEN PPP-MCNC: 622 MG/DL — HIGH (ref 290–520)
GLUCOSE SERPL-MCNC: 68 MG/DL — LOW (ref 70–99)
HCT VFR BLD CALC: 43.2 % — SIGNIFICANT CHANGE UP (ref 34.5–45)
HGB BLD-MCNC: 14.6 G/DL — SIGNIFICANT CHANGE UP (ref 11.5–15.5)
IANC: 8.18 K/UL — SIGNIFICANT CHANGE UP (ref 1.5–8.5)
IMM GRANULOCYTES NFR BLD AUTO: 0.2 % — SIGNIFICANT CHANGE UP (ref 0–1.5)
INR BLD: 1.02 RATIO — SIGNIFICANT CHANGE UP (ref 0.88–1.16)
LDH SERPL L TO P-CCNC: 182 U/L — SIGNIFICANT CHANGE UP (ref 135–225)
LYMPHOCYTES # BLD AUTO: 30.3 % — SIGNIFICANT CHANGE UP (ref 13–44)
LYMPHOCYTES # BLD AUTO: 4.15 K/UL — HIGH (ref 1–3.3)
MCHC RBC-ENTMCNC: 29.8 PG — SIGNIFICANT CHANGE UP (ref 27–34)
MCHC RBC-ENTMCNC: 33.8 GM/DL — SIGNIFICANT CHANGE UP (ref 32–36)
MCV RBC AUTO: 88.2 FL — SIGNIFICANT CHANGE UP (ref 80–100)
MONOCYTES # BLD AUTO: 1.07 K/UL — HIGH (ref 0–0.9)
MONOCYTES NFR BLD AUTO: 7.8 % — SIGNIFICANT CHANGE UP (ref 2–14)
NEUTROPHILS # BLD AUTO: 8.18 K/UL — HIGH (ref 1.8–7.4)
NEUTROPHILS NFR BLD AUTO: 59.8 % — SIGNIFICANT CHANGE UP (ref 43–77)
NRBC # BLD: 0 /100 WBCS — SIGNIFICANT CHANGE UP
NRBC # FLD: 0 K/UL — SIGNIFICANT CHANGE UP
PLATELET # BLD AUTO: 286 K/UL — SIGNIFICANT CHANGE UP (ref 150–400)
POTASSIUM SERPL-MCNC: 3.8 MMOL/L — SIGNIFICANT CHANGE UP (ref 3.5–5.3)
POTASSIUM SERPL-SCNC: 3.8 MMOL/L — SIGNIFICANT CHANGE UP (ref 3.5–5.3)
PROT SERPL-MCNC: 7.1 G/DL — SIGNIFICANT CHANGE UP (ref 6–8.3)
PROTHROM AB SERPL-ACNC: 11.7 SEC — SIGNIFICANT CHANGE UP (ref 10.6–13.6)
RBC # BLD: 4.9 M/UL — SIGNIFICANT CHANGE UP (ref 3.8–5.2)
RBC # FLD: 12.4 % — SIGNIFICANT CHANGE UP (ref 10.3–14.5)
SODIUM SERPL-SCNC: 134 MMOL/L — LOW (ref 135–145)
URATE SERPL-MCNC: 4 MG/DL — SIGNIFICANT CHANGE UP (ref 2.5–7)
WBC # BLD: 13.69 K/UL — HIGH (ref 3.8–10.5)
WBC # FLD AUTO: 13.69 K/UL — HIGH (ref 3.8–10.5)

## 2022-01-24 RX ORDER — NIFEDIPINE 30 MG
10 TABLET, EXTENDED RELEASE 24 HR ORAL ONCE
Refills: 0 | Status: COMPLETED | OUTPATIENT
Start: 2022-01-24 | End: 2022-01-24

## 2022-01-24 RX ORDER — NIFEDIPINE 30 MG
90 TABLET, EXTENDED RELEASE 24 HR ORAL DAILY
Refills: 0 | Status: DISCONTINUED | OUTPATIENT
Start: 2022-01-24 | End: 2022-02-04

## 2022-01-24 RX ADMIN — HEPARIN SODIUM 5000 UNIT(S): 5000 INJECTION INTRAVENOUS; SUBCUTANEOUS at 22:56

## 2022-01-24 RX ADMIN — Medication 6 MILLIGRAM(S): at 02:08

## 2022-01-24 RX ADMIN — Medication 90 MILLIGRAM(S): at 12:04

## 2022-01-24 RX ADMIN — HEPARIN SODIUM 5000 UNIT(S): 5000 INJECTION INTRAVENOUS; SUBCUTANEOUS at 09:15

## 2022-01-24 RX ADMIN — Medication 10 MILLIGRAM(S): at 01:39

## 2022-01-24 RX ADMIN — Medication 1 TABLET(S): at 09:15

## 2022-01-24 RX ADMIN — Medication 6 MILLIGRAM(S): at 22:56

## 2022-01-24 NOTE — PROGRESS NOTE ADULT - SUBJECTIVE AND OBJECTIVE BOX
R3 Antepartum Note, HD#    Patient seen and examined at bedside. Overnight patient had an elevated BP and received Procardia 10IR.  No acute complaints. Pt reports +FM, denies LOF, VB, ctx, HA, epigastric pain, blurred vision, CP, SOB, N/V, fevers, and chills.    Vital Signs Last 24 Hours  T(C): 36.9 (01-24-22 @ 05:41), Max: 36.9 (01-24-22 @ 05:41)  HR: 102 (01-24-22 @ 05:41) (95 - 122)  BP: 121/84 (01-24-22 @ 05:41) (121/84 - 156/106)  RR: 18 (01-24-22 @ 05:41) (17 - 18)  SpO2: 99% (01-24-22 @ 05:41) (98% - 100%)    Physical Exam:  General: NAD  Abdomen: Soft, non-tender, gravid  Ext: No pain or swelling    Labs:             14.2   10.57 )-----------( 244      ( 01-23 @ 13:00 )             40.9     01-23 @ 13:00    134  |  99  |  8   ----------------------------<  193  3.8   |  20  |  0.56    Ca    9.4      01-23 @ 13:00    TPro  6.8  /  Alb  3.8  /  TBili  0.3  /  DBili  x   /  AST  33  /  ALT  34  /  AlkPhos  91  01-23 @ 13:00    PT/INR - ( 01-23 @ 16:54 )   PT: 11.9 sec;   INR: 1.04 ratio    PTT - ( 01-22 @ 07:07 )  PTT:31.9 sec    Uric Acid: (01-23 @ 13:00)  --       Fibrinogen: (01-23 @ 13:00)  --       LDH: (01-23 @ 13:00)  213        MEDICATIONS  (STANDING):  heparin   Injectable 5000 Unit(s) SubCutaneous every 12 hours  melatonin 6 milliGRAM(s) Oral at bedtime  NIFEdipine XL 60 milliGRAM(s) Oral daily  prenatal multivitamin 1 Tablet(s) Oral daily R3 Antepartum Note    Patient seen and examined at bedside. Overnight patient had an elevated BP and received Procardia 10IR.  No acute complaints. Pt reports +FM, denies LOF, VB, ctx, HA, epigastric pain, blurred vision, CP, SOB, N/V, fevers, and chills.    Vital Signs Last 24 Hours  T(C): 36.9 (01-24-22 @ 05:41), Max: 36.9 (01-24-22 @ 05:41)  HR: 102 (01-24-22 @ 05:41) (95 - 122)  BP: 121/84 (01-24-22 @ 05:41) (121/84 - 156/106)  RR: 18 (01-24-22 @ 05:41) (17 - 18)  SpO2: 99% (01-24-22 @ 05:41) (98% - 100%)    Physical Exam:  General: NAD  Abdomen: Soft, non-tender, gravid  Ext: No pain or swelling    Labs:             14.2   10.57 )-----------( 244      ( 01-23 @ 13:00 )             40.9     01-23 @ 13:00    134  |  99  |  8   ----------------------------<  193  3.8   |  20  |  0.56    Ca    9.4      01-23 @ 13:00    TPro  6.8  /  Alb  3.8  /  TBili  0.3  /  DBili  x   /  AST  33  /  ALT  34  /  AlkPhos  91  01-23 @ 13:00    PT/INR - ( 01-23 @ 16:54 )   PT: 11.9 sec;   INR: 1.04 ratio    PTT - ( 01-22 @ 07:07 )  PTT:31.9 sec    Uric Acid: (01-23 @ 13:00)  --       Fibrinogen: (01-23 @ 13:00)  --       LDH: (01-23 @ 13:00)  213        MEDICATIONS  (STANDING):  heparin   Injectable 5000 Unit(s) SubCutaneous every 12 hours  melatonin 6 milliGRAM(s) Oral at bedtime  NIFEdipine XL 60 milliGRAM(s) Oral daily  prenatal multivitamin 1 Tablet(s) Oral daily

## 2022-01-24 NOTE — PROGRESS NOTE ADULT - ASSESSMENT
27 yo  at 23w4d admitted with severe range blood pressures and proteinuria meeting criteria for sPEC.  Additionally, fetus found to be of a non-viable weight with AEDV on UAD overall indicating poor prognosis. Patient counseled on diagnosis of sPEC and options at this age including, proceeding with pregnancy with understanding of risks of severe prematurity or interruption of pregnancy.  Patient is electing to continue to the pregnancy after adequate counseling.     #sPEC  -s/p magnesium x24 hours for seizure ppx  -HELLP labs q3D, AM labs pending   -initial abnormal lab value of P/C= 1.3, all other labs wnl  -Pt received [id number] overnight.   -c/w Procardia 60 XL for stabilization of BPs    #Fetal well being  -defer fetal monitoring and steroids at this time as fetus is not of a viable weight  -daily FH check  -s/p NICU consultation , patient expressed understanding of the risks of severe prematurity  -comfort care in case of delivery at this time  -consents signed for classical  section on admission in case of emergent situation    #Maternal well being  -regular diet  -HSQ, SCDs for DVT ppx    Mason Messer PGY3  25 yo  at 23w4d admitted with severe range blood pressures and proteinuria meeting criteria for sPEC.  Additionally, fetus found to be of a non-viable weight with AEDV on UAD overall indicating poor prognosis. Patient counseled on diagnosis of sPEC and options at this age including, proceeding with pregnancy with understanding of risks of severe prematurity or interruption of pregnancy.  Patient is electing to continue to the pregnancy after adequate counseling.     #sPEC  -s/p magnesium x24 hours for seizure ppx  -HELLP labs q3D, AM labs pending   -initial abnormal lab value of P/C= 1.3, all other labs wnl  -Pt received [id number] overnight.   -c/w Procardia 60 XL for stabilization of BPs    #Fetal well being  -defer fetal monitoring and steroids at this time as fetus is not of a viable weight  -daily FH check  -s/p NICU consultation , patient expressed understanding of the risks of severe prematurity  -comfort care in case of delivery at this time  -consents signed for classical  section on admission in case of emergent situation    #Maternal well being  -regular diet  -HSQ, SCDs for DVT ppx    Mason Messer PGY3     MFM Fellow Note     Patient is a 26 yr  23 4/7 weeks with preeclampsia with severe features and FGR (355g). Patient's blood pressure were reviewed and she had elevated blood pressure overnight, not severe, however treated with Procardia 10 IR. Patient was counseled again that there is concern that the disease process of preeclampsia may be worsening due to rising blood pressure. We offered interruption of pregnancy again today and reviewed with gestational age limit of 24 weeks in [place]. Patient voiced understanding and at this time would like to continue medical management of preeclampsia and continue pregnancy. We reviewed that if patient has sustained severe range blood pressure not able to treated with medication, we would recommend to proceed with delivery. If delivery is required before 24 weeks, patient would like D&E if available and if not induction of labor with comfort carefor the . Patient understands that if she undergoes induction, she would like no fetal monitoring which can be associated with a risk of stillbirth during the labor process.   We will continue to monitor patient closely. Will performed sonogram today, if evidence of REDV, will proceed with delivery. Will continue to monitor blood pressure closely with serial HELLP labs and blood pressure monitoring Will increase to Procardia 90mg XL.     Patient was seen with and evaluated with Dr. Longoria (MFM Attending)     Silvestre Horta MD   MFM Fellow

## 2022-01-25 LAB
ALBUMIN SERPL ELPH-MCNC: 3.8 G/DL — SIGNIFICANT CHANGE UP (ref 3.3–5)
ALP SERPL-CCNC: 100 U/L — SIGNIFICANT CHANGE UP (ref 40–120)
ALT FLD-CCNC: 31 U/L — SIGNIFICANT CHANGE UP (ref 4–33)
ANION GAP SERPL CALC-SCNC: 13 MMOL/L — SIGNIFICANT CHANGE UP (ref 7–14)
APTT BLD: 33.9 SEC — SIGNIFICANT CHANGE UP (ref 27–36.3)
AST SERPL-CCNC: 24 U/L — SIGNIFICANT CHANGE UP (ref 4–32)
BASOPHILS # BLD AUTO: 0.07 K/UL — SIGNIFICANT CHANGE UP (ref 0–0.2)
BASOPHILS NFR BLD AUTO: 0.5 % — SIGNIFICANT CHANGE UP (ref 0–2)
BILIRUB SERPL-MCNC: 0.3 MG/DL — SIGNIFICANT CHANGE UP (ref 0.2–1.2)
BUN SERPL-MCNC: 8 MG/DL — SIGNIFICANT CHANGE UP (ref 7–23)
CALCIUM SERPL-MCNC: 9.1 MG/DL — SIGNIFICANT CHANGE UP (ref 8.4–10.5)
CHLORIDE SERPL-SCNC: 102 MMOL/L — SIGNIFICANT CHANGE UP (ref 98–107)
CO2 SERPL-SCNC: 20 MMOL/L — LOW (ref 22–31)
CREAT SERPL-MCNC: 0.54 MG/DL — SIGNIFICANT CHANGE UP (ref 0.5–1.3)
EOSINOPHIL # BLD AUTO: 0.21 K/UL — SIGNIFICANT CHANGE UP (ref 0–0.5)
EOSINOPHIL NFR BLD AUTO: 1.6 % — SIGNIFICANT CHANGE UP (ref 0–6)
FIBRINOGEN PPP-MCNC: 622 MG/DL — HIGH (ref 290–520)
GLUCOSE SERPL-MCNC: 85 MG/DL — SIGNIFICANT CHANGE UP (ref 70–99)
HCT VFR BLD CALC: 44.9 % — SIGNIFICANT CHANGE UP (ref 34.5–45)
HGB BLD-MCNC: 15.2 G/DL — SIGNIFICANT CHANGE UP (ref 11.5–15.5)
IANC: 7.89 K/UL — SIGNIFICANT CHANGE UP (ref 1.5–8.5)
IMM GRANULOCYTES NFR BLD AUTO: 0.3 % — SIGNIFICANT CHANGE UP (ref 0–1.5)
INR BLD: 1.06 RATIO — SIGNIFICANT CHANGE UP (ref 0.88–1.16)
LDH SERPL L TO P-CCNC: 180 U/L — SIGNIFICANT CHANGE UP (ref 135–225)
LYMPHOCYTES # BLD AUTO: 3.95 K/UL — HIGH (ref 1–3.3)
LYMPHOCYTES # BLD AUTO: 30.2 % — SIGNIFICANT CHANGE UP (ref 13–44)
MCHC RBC-ENTMCNC: 29.8 PG — SIGNIFICANT CHANGE UP (ref 27–34)
MCHC RBC-ENTMCNC: 33.9 GM/DL — SIGNIFICANT CHANGE UP (ref 32–36)
MCV RBC AUTO: 88 FL — SIGNIFICANT CHANGE UP (ref 80–100)
MONOCYTES # BLD AUTO: 0.93 K/UL — HIGH (ref 0–0.9)
MONOCYTES NFR BLD AUTO: 7.1 % — SIGNIFICANT CHANGE UP (ref 2–14)
NEUTROPHILS # BLD AUTO: 7.89 K/UL — HIGH (ref 1.8–7.4)
NEUTROPHILS NFR BLD AUTO: 60.3 % — SIGNIFICANT CHANGE UP (ref 43–77)
NRBC # BLD: 0 /100 WBCS — SIGNIFICANT CHANGE UP
NRBC # FLD: 0 K/UL — SIGNIFICANT CHANGE UP
PLATELET # BLD AUTO: 289 K/UL — SIGNIFICANT CHANGE UP (ref 150–400)
POTASSIUM SERPL-MCNC: 3.7 MMOL/L — SIGNIFICANT CHANGE UP (ref 3.5–5.3)
POTASSIUM SERPL-SCNC: 3.7 MMOL/L — SIGNIFICANT CHANGE UP (ref 3.5–5.3)
PROT SERPL-MCNC: 7.3 G/DL — SIGNIFICANT CHANGE UP (ref 6–8.3)
PROTHROM AB SERPL-ACNC: 12 SEC — SIGNIFICANT CHANGE UP (ref 10.6–13.6)
RBC # BLD: 5.1 M/UL — SIGNIFICANT CHANGE UP (ref 3.8–5.2)
RBC # FLD: 12.3 % — SIGNIFICANT CHANGE UP (ref 10.3–14.5)
SODIUM SERPL-SCNC: 135 MMOL/L — SIGNIFICANT CHANGE UP (ref 135–145)
URATE SERPL-MCNC: 4.1 MG/DL — SIGNIFICANT CHANGE UP (ref 2.5–7)
WBC # BLD: 13.09 K/UL — HIGH (ref 3.8–10.5)
WBC # FLD AUTO: 13.09 K/UL — HIGH (ref 3.8–10.5)

## 2022-01-25 PROCEDURE — 99232 SBSQ HOSP IP/OBS MODERATE 35: CPT | Mod: GC

## 2022-01-25 RX ADMIN — HEPARIN SODIUM 5000 UNIT(S): 5000 INJECTION INTRAVENOUS; SUBCUTANEOUS at 22:00

## 2022-01-25 RX ADMIN — HEPARIN SODIUM 5000 UNIT(S): 5000 INJECTION INTRAVENOUS; SUBCUTANEOUS at 10:01

## 2022-01-25 RX ADMIN — Medication 6 MILLIGRAM(S): at 22:00

## 2022-01-25 RX ADMIN — Medication 1 TABLET(S): at 10:02

## 2022-01-25 RX ADMIN — Medication 90 MILLIGRAM(S): at 10:01

## 2022-01-25 NOTE — PROGRESS NOTE ADULT - SUBJECTIVE AND OBJECTIVE BOX
Patient seen and examined at bedside, no acute overnight events. No acute complaints. Pt reports +FM, denies LOF, VB, ctx, HA, epigastric pain, blurred vision, CP, SOB, N/V, fevers, and chills.    Vital Signs Last 24 Hours  T(C): 36.8 (01-25-22 @ 05:50), Max: 37.1 (01-24-22 @ 09:09)  HR: 113 (01-25-22 @ 05:50) (90 - 116)  BP: 135/84 (01-25-22 @ 05:50) (114/63 - 147/96)  RR: 16 (01-25-22 @ 05:50) (16 - 18)  SpO2: 100% (01-25-22 @ 05:50) (98% - 100%)      Physical Exam:  General: NAD  Abdomen: Soft, non-tender, gravid  Ext: No pain or swelling      Labs:             15.2   13.09 )-----------( 289      ( 01-25 @ 06:49 )             44.9     01-24 @ 07:22    134  |  100  |  10  ----------------------------<  68  3.8   |  20  |  0.54    Ca    9.3      01-24 @ 07:22    TPro  7.1  /  Alb  4.0  /  TBili  0.3  /  DBili  x   /  AST  26  /  ALT  31  /  AlkPhos  95  01-24 @ 07:22    PT/INR - ( 01-24 @ 07:22 )   PT: 11.7 sec;   INR: 1.02 ratio    PTT - ( 01-24 @ 07:22 )  PTT:32.8 sec    Uric Acid: (01-24 @ 07:22)  4.0      Fibrinogen: (01-24 @ 07:22)  622      LDH: (01-24 @ 07:22)  182        MEDICATIONS  (STANDING):  heparin   Injectable 5000 Unit(s) SubCutaneous every 12 hours  melatonin 6 milliGRAM(s) Oral at bedtime  NIFEdipine XL 90 milliGRAM(s) Oral daily  prenatal multivitamin 1 Tablet(s) Oral daily    MEDICATIONS  (PRN):   Patient seen and examined at bedside, no acute overnight events. No acute complaints. Pt reports +FM, denies LOF, VB, ctx, HA, epigastric pain, blurred vision, CP, SOB, N/V, fevers, and chills.    Vital Signs Last 24 Hours  T(C): 36.8 (01-25-22 @ 05:50), Max: 37.1 (01-24-22 @ 09:09)  HR: 113 (01-25-22 @ 05:50) (90 - 116)  BP: 135/84 (01-25-22 @ 05:50) (114/63 - 147/96)  RR: 16 (01-25-22 @ 05:50) (16 - 18)  SpO2: 100% (01-25-22 @ 05:50) (98% - 100%)      Physical Exam:  General: NAD  Abdomen: Soft, non-tender, gravid  Ext: No pain or swelling      Labs:             15.2   13.09 )-----------( 289      ( 01-25 @ 06:49 )             44.9     01-24 @ 07:22    134  |  100  |  10  ----------------------------<  68  3.8   |  20  |  0.54    Ca    9.3      01-24 @ 07:22    TPro  7.1  /  Alb  4.0  /  TBili  0.3  /  DBili  x   /  AST  26  /  ALT  31  /  AlkPhos  95  01-24 @ 07:22    PT/INR - ( 01-24 @ 07:22 )   PT: 11.7 sec;   INR: 1.02 ratio    PTT - ( 01-24 @ 07:22 )  PTT:32.8 sec    Uric Acid: (01-24 @ 07:22)  4.0      Fibrinogen: (01-24 @ 07:22)  622      LDH: (01-24 @ 07:22)  182        MEDICATIONS  (STANDING):  heparin   Injectable 5000 Unit(s) SubCutaneous every 12 hours  melatonin 6 milliGRAM(s) Oral at bedtime  NIFEdipine XL 90 milliGRAM(s) Oral daily  prenatal multivitamin 1 Tablet(s) Oral daily

## 2022-01-25 NOTE — PROGRESS NOTE ADULT - ASSESSMENT
25 yo  at 23w6d admitted with severe range blood pressures and proteinuria meeting criteria for sPEC.  Additionally, fetus found to be of a non-viable weight with AEDV on UAD overall indicating poor prognosis. Patient counseled on diagnosis of sPEC and options at this age including, proceeding with pregnancy with understanding of risks of severe prematurity or interruption of pregnancy.  Patient is electing to continue to the pregnancy after adequate counseling.     #sPEC  -s/p magnesium x24 hours for seizure ppx  -HELLP labs qD, AM labs pending   -initial abnormal lab value of P/C= 1.3, all other labs wnl.   -c/w Procardia 90 XL for stabilization of BPs    #Fetal well being  -defer fetal monitoring and steroids at this time as fetus is not of a viable weight  -daily FH check  - ATU (): variable, posterior, AEDV, MCA wnl  -s/p NICU consultation , patient expressed understanding of the risks of severe prematurity  -comfort care in case of delivery at this time  -consents signed for classical  section on admission in case of emergent situation    #Maternal well being  -regular diet  -HSQ, SCDs for DVT ppx    Mason Messer PGY3  25 yo  at 23w6d admitted with severe range blood pressures and proteinuria meeting criteria for sPEC.  Additionally, fetus found to be of a non-viable weight with AEDV on UAD overall indicating poor prognosis. Patient counseled on diagnosis of sPEC and options at this age including, proceeding with pregnancy with understanding of risks of severe prematurity or interruption of pregnancy.  Patient is electing to continue to the pregnancy after adequate counseling.     #sPEC  -s/p magnesium x24 hours for seizure ppx  -HELLP labs qD, AM labs pending   -initial abnormal lab value of P/C= 1.3, all other labs wnl.   -c/w Procardia 90 XL for stabilization of BPs    #Fetal well being  -defer fetal monitoring and steroids at this time as fetus is not of a viable weight  -daily FH check  - ATU (): variable, posterior, AEDV, MCA wnl  -s/p NICU consultation , patient expressed understanding of the risks of severe prematurity  -comfort care in case of delivery at this time  -consents signed for classical  section on admission in case of emergent situation    #Maternal well being  -regular diet  -HSQ, SCDs for DVT ppx    Mason Messer PGY3     MFM Fellow Note     Patient is a 26 yr  23 6/7 weeks with preeclampsia with severe features and FGR (355g). Patients blood presurgery are well controlled on Procardia 60mg XL. She has no PEC symptoms and HELLP labs WNL. Patient understands she will be 24 weeks tomorrow and today would be the last day for interruption of pregnancy. Patient would like to continue expectant management. We reviewed that options include fetal monitoring and betamethasone starting tomorrow which may required deliver of fetus via classical . Secondary to poor fetal prognosis, patient does not desire fetal monitoring at this time. After the next growth sonogram is complete she would decide at weight and/or gestational age she would like fetal monitoring and initiating of BMZ. She understands by not monitoring fetus, she is at risk for a stillbirth. We reviewed that she may need to be delivered for maternal indication as well. If delivery is required for maternal indication prior to next sonogram/decision for monitoring she will decide at that time what mode of delivery and/or fetal monitoring she would prefer. Will continue to monitor patient closely inpatient.     Patient seen with and evalauted with Dr. Heard (MFM Attending)     Silvestre Horta MD   MFM Fellow          Silvestre Horta MD   MFM Fellow      27 yo  at 23w6d admitted with severe range blood pressures and proteinuria meeting criteria for sPEC.  Additionally, fetus found to be of a non-viable weight with AEDV on UAD overall indicating poor prognosis. Patient counseled on diagnosis of sPEC and options at this age including, proceeding with pregnancy with understanding of risks of severe prematurity or interruption of pregnancy.  Patient is electing to continue to the pregnancy after adequate counseling.     #sPEC  -s/p magnesium x24 hours for seizure ppx  -HELLP labs qD, AM labs pending   -initial abnormal lab value of P/C= 1.3, all other labs wnl.   -c/w Procardia 90 XL for stabilization of BPs    #Fetal well being  -defer fetal monitoring and steroids at this time as fetus is not of a viable weight  -daily FH check  - ATU (): variable, posterior, AEDV, MCA wnl  -s/p NICU consultation , patient expressed understanding of the risks of severe prematurity  -comfort care in case of delivery at this time  -consents signed for classical  section on admission in case of emergent situation    #Maternal well being  -regular diet  -HSQ, SCDs for DVT ppx    Mason Messer PGY3     MFM Fellow Note     Patient is a 26 yr  23 6/7 weeks with preeclampsia with severe features and FGR (355g). Patients blood presurgery are well controlled on Procardia 60mg XL. She has no PEC symptoms and HELLP labs WNL. Patient understands she will be 24 weeks tomorrow and today would be the last day for interruption of pregnancy. Patient would like to continue expectant management. We reviewed that options include fetal monitoring and betamethasone starting tomorrow which may required deliver of fetus via classical . Secondary to poor fetal prognosis, patient does not desire fetal monitoring at this time. After the next growth sonogram is complete she would decide at weight and/or gestational age she would like fetal monitoring and initiating of BMZ. She understands by not monitoring fetus, she is at risk for a stillbirth. We reviewed that she may need to be delivered for maternal indication as well. If delivery is required for maternal indication prior to next sonogram/decision for monitoring she will decide at that time what mode of delivery and/or fetal monitoring she would prefer. Will continue to monitor patient closely inpatient.     Patient seen with and evalauted with Dr. Heard (MFM Attending)     Silvestre Horta MD   MFM Fellow

## 2022-01-26 LAB
ALBUMIN SERPL ELPH-MCNC: 3.8 G/DL — SIGNIFICANT CHANGE UP (ref 3.3–5)
ALP SERPL-CCNC: 106 U/L — SIGNIFICANT CHANGE UP (ref 40–120)
ALT FLD-CCNC: 27 U/L — SIGNIFICANT CHANGE UP (ref 4–33)
ANION GAP SERPL CALC-SCNC: 13 MMOL/L — SIGNIFICANT CHANGE UP (ref 7–14)
APTT BLD: 36.3 SEC — SIGNIFICANT CHANGE UP (ref 27–36.3)
AST SERPL-CCNC: 23 U/L — SIGNIFICANT CHANGE UP (ref 4–32)
BASOPHILS # BLD AUTO: 0.06 K/UL — SIGNIFICANT CHANGE UP (ref 0–0.2)
BASOPHILS NFR BLD AUTO: 0.5 % — SIGNIFICANT CHANGE UP (ref 0–2)
BILIRUB SERPL-MCNC: 0.4 MG/DL — SIGNIFICANT CHANGE UP (ref 0.2–1.2)
BLD GP AB SCN SERPL QL: NEGATIVE — SIGNIFICANT CHANGE UP
BUN SERPL-MCNC: 6 MG/DL — LOW (ref 7–23)
CALCIUM SERPL-MCNC: 9.4 MG/DL — SIGNIFICANT CHANGE UP (ref 8.4–10.5)
CHLORIDE SERPL-SCNC: 102 MMOL/L — SIGNIFICANT CHANGE UP (ref 98–107)
CO2 SERPL-SCNC: 20 MMOL/L — LOW (ref 22–31)
CREAT SERPL-MCNC: 0.57 MG/DL — SIGNIFICANT CHANGE UP (ref 0.5–1.3)
EOSINOPHIL # BLD AUTO: 0.25 K/UL — SIGNIFICANT CHANGE UP (ref 0–0.5)
EOSINOPHIL NFR BLD AUTO: 2.2 % — SIGNIFICANT CHANGE UP (ref 0–6)
FIBRINOGEN PPP-MCNC: 624 MG/DL — HIGH (ref 290–520)
GLUCOSE SERPL-MCNC: 80 MG/DL — SIGNIFICANT CHANGE UP (ref 70–99)
HCT VFR BLD CALC: 44.7 % — SIGNIFICANT CHANGE UP (ref 34.5–45)
HGB BLD-MCNC: 14.9 G/DL — SIGNIFICANT CHANGE UP (ref 11.5–15.5)
IANC: 6.32 K/UL — SIGNIFICANT CHANGE UP (ref 1.5–8.5)
IMM GRANULOCYTES NFR BLD AUTO: 0.3 % — SIGNIFICANT CHANGE UP (ref 0–1.5)
INR BLD: 1 RATIO — SIGNIFICANT CHANGE UP (ref 0.88–1.16)
LDH SERPL L TO P-CCNC: 189 U/L — SIGNIFICANT CHANGE UP (ref 135–225)
LYMPHOCYTES # BLD AUTO: 3.89 K/UL — HIGH (ref 1–3.3)
LYMPHOCYTES # BLD AUTO: 34 % — SIGNIFICANT CHANGE UP (ref 13–44)
MCHC RBC-ENTMCNC: 29.6 PG — SIGNIFICANT CHANGE UP (ref 27–34)
MCHC RBC-ENTMCNC: 33.3 GM/DL — SIGNIFICANT CHANGE UP (ref 32–36)
MCV RBC AUTO: 88.9 FL — SIGNIFICANT CHANGE UP (ref 80–100)
MONOCYTES # BLD AUTO: 0.89 K/UL — SIGNIFICANT CHANGE UP (ref 0–0.9)
MONOCYTES NFR BLD AUTO: 7.8 % — SIGNIFICANT CHANGE UP (ref 2–14)
NEUTROPHILS # BLD AUTO: 6.32 K/UL — SIGNIFICANT CHANGE UP (ref 1.8–7.4)
NEUTROPHILS NFR BLD AUTO: 55.2 % — SIGNIFICANT CHANGE UP (ref 43–77)
NRBC # BLD: 0 /100 WBCS — SIGNIFICANT CHANGE UP
NRBC # FLD: 0 K/UL — SIGNIFICANT CHANGE UP
PLATELET # BLD AUTO: 303 K/UL — SIGNIFICANT CHANGE UP (ref 150–400)
POTASSIUM SERPL-MCNC: 3.9 MMOL/L — SIGNIFICANT CHANGE UP (ref 3.5–5.3)
POTASSIUM SERPL-SCNC: 3.9 MMOL/L — SIGNIFICANT CHANGE UP (ref 3.5–5.3)
PROT SERPL-MCNC: 7.4 G/DL — SIGNIFICANT CHANGE UP (ref 6–8.3)
PROTHROM AB SERPL-ACNC: 11.5 SEC — SIGNIFICANT CHANGE UP (ref 10.6–13.6)
RBC # BLD: 5.03 M/UL — SIGNIFICANT CHANGE UP (ref 3.8–5.2)
RBC # FLD: 12.4 % — SIGNIFICANT CHANGE UP (ref 10.3–14.5)
RH IG SCN BLD-IMP: POSITIVE — SIGNIFICANT CHANGE UP
SODIUM SERPL-SCNC: 135 MMOL/L — SIGNIFICANT CHANGE UP (ref 135–145)
URATE SERPL-MCNC: 4 MG/DL — SIGNIFICANT CHANGE UP (ref 2.5–7)
WBC # BLD: 11.44 K/UL — HIGH (ref 3.8–10.5)
WBC # FLD AUTO: 11.44 K/UL — HIGH (ref 3.8–10.5)

## 2022-01-26 PROCEDURE — 99232 SBSQ HOSP IP/OBS MODERATE 35: CPT | Mod: GC

## 2022-01-26 RX ADMIN — HEPARIN SODIUM 5000 UNIT(S): 5000 INJECTION INTRAVENOUS; SUBCUTANEOUS at 21:53

## 2022-01-26 RX ADMIN — Medication 6 MILLIGRAM(S): at 21:54

## 2022-01-26 RX ADMIN — Medication 90 MILLIGRAM(S): at 09:46

## 2022-01-26 RX ADMIN — HEPARIN SODIUM 5000 UNIT(S): 5000 INJECTION INTRAVENOUS; SUBCUTANEOUS at 09:46

## 2022-01-26 RX ADMIN — Medication 1 TABLET(S): at 09:46

## 2022-01-26 NOTE — PROGRESS NOTE ADULT - ASSESSMENT
27 yo  at 24w admitted with severe range blood pressures and proteinuria meeting criteria for sPEC.  Additionally, fetus found to be of a non-viable weight with AEDV on UAD overall indicating poor prognosis. Patient counseled on diagnosis of sPEC and options at this age including, proceeding with pregnancy with understanding of risks of severe prematurity or interruption of pregnancy.  Patient is electing to continue to the pregnancy after adequate counseling.     #sPEC  -s/p magnesium x24 hours for seizure ppx  -HELLP labs qD, AM labs pending   -initial abnormal lab value of P/C= 1.3, all other labs wnl.   -c/w Procardia 90 XL for stabilization of BPs    #Fetal well being  -daily FH check with BPP 2x/wk  - Will defer BMZ at this time unless clinical change  - ATU (): variable, posterior, AEDV, MCA wnl  -s/p NICU consultation , patient expressed understanding of the risks of severe prematurity  -MOD to be determined based on clinical situation at that time.    #Maternal well being  -regular diet  -HSQ, SCDs for DVT ppx    Mason Messer PGY3    25 yo  at 24w admitted with severe range blood pressures and proteinuria meeting criteria for sPEC.  Additionally, fetus found to be of a non-viable weight with AEDV on UAD overall indicating poor prognosis. Patient counseled on diagnosis of sPEC and options at this age including, proceeding with pregnancy with understanding of risks of severe prematurity or interruption of pregnancy.  Patient is electing to continue to the pregnancy after adequate counseling.     #sPEC  -s/p magnesium x24 hours for seizure ppx  -HELLP labs qD, AM labs pending   -initial abnormal lab value of P/C= 1.3, all other labs wnl.   -c/w Procardia 90 XL for stabilization of BPs    #Fetal well being  -daily FH check with BPP 2x/wk  - Will defer BMZ at this time unless clinical change  - ATU (): variable, posterior, AEDV, MCA wnl  -s/p NICU consultation , patient expressed understanding of the risks of severe prematurity  -MOD to be determined based on clinical situation at that time.    #Maternal well being  -regular diet  -HSQ, SCDs for DVT ppx    Mason Messer PGY3     MFM Fellow Note     Patient is a 26 yr  24 weeks with preeclampsia with severe features and FGR (355g). Patients blood presurgery are well controlled on Procardia 60mg XL. She has no PEC symptoms and HELLP labs WNL.Secondary to poor fetal prognosis, patient does not desire fetal monitoring at this time. After the next growth sonogram is complete she would decide at weight and/or gestational age she would like fetal monitoring and initiating of BMZ. She understands by not monitoring fetus, she is at risk for a stillbirth. . If delivery is required for maternal indication prior to next sonogram/decision for monitoring she will decide at that time what mode of delivery and/or fetal monitoring she would prefer. Will continue to monitor patient closely inpatient for worsening preeclampsia symptoms including blood pressure, PEC symptoms and serial HELLP labs. Will only perform fetal heart rate check until patient desires fetal monitoring based on next growth sonogram.     Patient seen with and evaluated with Dr. Heard (M Attending)     Silvestre Horta MD   M Fellow   27 yo  at 24w admitted with severe range blood pressures and proteinuria meeting criteria for sPEC.  Additionally, fetus found to be of a non-viable weight with AEDV on UAD overall indicating poor prognosis. Patient counseled on diagnosis of sPEC and options at this age including, proceeding with pregnancy with understanding of risks of severe prematurity or interruption of pregnancy.  Patient is electing to continue to the pregnancy after adequate counseling.     #sPEC  -s/p magnesium x24 hours for seizure ppx  -HELLP labs qD, AM labs pending   -initial abnormal lab value of P/C= 1.3, all other labs wnl.   -c/w Procardia 90 XL for stabilization of BPs    #Fetal well being  -daily FH check with BPP 2x/wk  - Will defer BMZ at this time unless clinical change  - ATU (): variable, posterior, AEDV, MCA wnl  -s/p NICU consultation , patient expressed understanding of the risks of severe prematurity  -MOD to be determined based on clinical situation at that time.    #Maternal well being  -regular diet  -HSQ, SCDs for DVT ppx    Mason Messer PGY3     MFM Fellow Note     Patient is a 26 yr  24 weeks with preeclampsia with severe features and FGR (355g). Patients blood presurgery are well controlled on Procardia 60mg XL. She has no PEC symptoms and HELLP labs WNL. Secondary to poor fetal prognosis, patient does not desire fetal monitoring at this time. After the next growth sonogram is complete she would decide at weight and/or gestational age she would like fetal monitoring and initiating of BMZ. She understands by not monitoring fetus, she is at risk for a stillbirth. If delivery is required for maternal indication prior to next sonogram/decision for monitoring she will decide at that time what mode of delivery and/or fetal monitoring she would prefer. Will continue to monitor patient closely inpatient for worsening preeclampsia symptoms including blood pressure, PEC symptoms and serial HELLP labs. Will only perform fetal heart rate check until patient desires fetal monitoring based on next growth sonogram.     Patient seen with and evaluated with Dr. Heard (M Attending)     Silvestre Hotra MD   M Fellow

## 2022-01-26 NOTE — PROGRESS NOTE ADULT - SUBJECTIVE AND OBJECTIVE BOX
R3 Antepartum Note    Patient seen and examined at bedside, no acute overnight events. No acute complaints. Pt reports +FM, denies LOF, VB, ctx, HA, epigastric pain, blurred vision, CP, SOB, N/V, fevers, and chills.    Vital Signs Last 24 Hours  T(C): 37.3 (01-26-22 @ 05:19), Max: 37.4 (01-25-22 @ 17:27)  HR: 105 (01-26-22 @ 05:19) (99 - 120)  BP: 142/96 (01-26-22 @ 05:19) (125/87 - 142/96)  RR: 18 (01-26-22 @ 05:19) (17 - 19)  SpO2: 100% (01-26-22 @ 05:19) (99% - 100%)    Physical Exam:  General: NAD  Abdomen: Soft, non-tender, gravid  Ext: No pain or swelling    Labs:             14.9   11.44 )-----------( 303      ( 01-26 @ 06:53 )             44.7     01-26 @ 06:53    135  |  102  |  6   ----------------------------<  80  3.9   |  20  |  0.57    Ca    9.4      01-26 @ 06:53    TPro  7.4  /  Alb  3.8  /  TBili  0.4  /  DBili  x   /  AST  23  /  ALT  27  /  AlkPhos  106  01-26 @ 06:53    PT/INR - ( 01-26 @ 06:53 )   PT: 11.5 sec;   INR: 1.00 ratio    PTT - ( 01-26 @ 06:53 )  PTT:36.3 sec    Uric Acid: (01-26 @ 06:53)  4.0      Fibrinogen: (01-26 @ 06:53)  624      LDH: (01-26 @ 06:53)  189        MEDICATIONS  (STANDING):  heparin   Injectable 5000 Unit(s) SubCutaneous every 12 hours  melatonin 6 milliGRAM(s) Oral at bedtime  NIFEdipine XL 90 milliGRAM(s) Oral daily  prenatal multivitamin 1 Tablet(s) Oral daily

## 2022-01-27 LAB
ALBUMIN SERPL ELPH-MCNC: 3.7 G/DL — SIGNIFICANT CHANGE UP (ref 3.3–5)
ALP SERPL-CCNC: 103 U/L — SIGNIFICANT CHANGE UP (ref 40–120)
ALT FLD-CCNC: 23 U/L — SIGNIFICANT CHANGE UP (ref 4–33)
ANION GAP SERPL CALC-SCNC: 13 MMOL/L — SIGNIFICANT CHANGE UP (ref 7–14)
APTT BLD: 33.3 SEC — SIGNIFICANT CHANGE UP (ref 27–36.3)
AST SERPL-CCNC: 21 U/L — SIGNIFICANT CHANGE UP (ref 4–32)
BASOPHILS # BLD AUTO: 0.08 K/UL — SIGNIFICANT CHANGE UP (ref 0–0.2)
BASOPHILS NFR BLD AUTO: 0.6 % — SIGNIFICANT CHANGE UP (ref 0–2)
BILIRUB SERPL-MCNC: 0.4 MG/DL — SIGNIFICANT CHANGE UP (ref 0.2–1.2)
BUN SERPL-MCNC: 7 MG/DL — SIGNIFICANT CHANGE UP (ref 7–23)
CALCIUM SERPL-MCNC: 9.2 MG/DL — SIGNIFICANT CHANGE UP (ref 8.4–10.5)
CHLORIDE SERPL-SCNC: 100 MMOL/L — SIGNIFICANT CHANGE UP (ref 98–107)
CO2 SERPL-SCNC: 22 MMOL/L — SIGNIFICANT CHANGE UP (ref 22–31)
CREAT SERPL-MCNC: 0.65 MG/DL — SIGNIFICANT CHANGE UP (ref 0.5–1.3)
EOSINOPHIL # BLD AUTO: 0.26 K/UL — SIGNIFICANT CHANGE UP (ref 0–0.5)
EOSINOPHIL NFR BLD AUTO: 2.1 % — SIGNIFICANT CHANGE UP (ref 0–6)
FIBRINOGEN PPP-MCNC: 586 MG/DL — HIGH (ref 290–520)
GLUCOSE SERPL-MCNC: 72 MG/DL — SIGNIFICANT CHANGE UP (ref 70–99)
HCT VFR BLD CALC: 42.3 % — SIGNIFICANT CHANGE UP (ref 34.5–45)
HGB BLD-MCNC: 14.6 G/DL — SIGNIFICANT CHANGE UP (ref 11.5–15.5)
IANC: 7.44 K/UL — SIGNIFICANT CHANGE UP (ref 1.5–8.5)
IMM GRANULOCYTES NFR BLD AUTO: 0.2 % — SIGNIFICANT CHANGE UP (ref 0–1.5)
INR BLD: 1.04 RATIO — SIGNIFICANT CHANGE UP (ref 0.88–1.16)
LDH SERPL L TO P-CCNC: 184 U/L — SIGNIFICANT CHANGE UP (ref 135–225)
LYMPHOCYTES # BLD AUTO: 29.6 % — SIGNIFICANT CHANGE UP (ref 13–44)
LYMPHOCYTES # BLD AUTO: 3.69 K/UL — HIGH (ref 1–3.3)
MCHC RBC-ENTMCNC: 29.9 PG — SIGNIFICANT CHANGE UP (ref 27–34)
MCHC RBC-ENTMCNC: 34.5 GM/DL — SIGNIFICANT CHANGE UP (ref 32–36)
MCV RBC AUTO: 86.7 FL — SIGNIFICANT CHANGE UP (ref 80–100)
MONOCYTES # BLD AUTO: 0.95 K/UL — HIGH (ref 0–0.9)
MONOCYTES NFR BLD AUTO: 7.6 % — SIGNIFICANT CHANGE UP (ref 2–14)
NEUTROPHILS # BLD AUTO: 7.44 K/UL — HIGH (ref 1.8–7.4)
NEUTROPHILS NFR BLD AUTO: 59.9 % — SIGNIFICANT CHANGE UP (ref 43–77)
NRBC # BLD: 0 /100 WBCS — SIGNIFICANT CHANGE UP
NRBC # FLD: 0 K/UL — SIGNIFICANT CHANGE UP
PLATELET # BLD AUTO: 256 K/UL — SIGNIFICANT CHANGE UP (ref 150–400)
POTASSIUM SERPL-MCNC: 4.1 MMOL/L — SIGNIFICANT CHANGE UP (ref 3.5–5.3)
POTASSIUM SERPL-SCNC: 4.1 MMOL/L — SIGNIFICANT CHANGE UP (ref 3.5–5.3)
PROT SERPL-MCNC: 6.9 G/DL — SIGNIFICANT CHANGE UP (ref 6–8.3)
PROTHROM AB SERPL-ACNC: 11.8 SEC — SIGNIFICANT CHANGE UP (ref 10.6–13.6)
RBC # BLD: 4.88 M/UL — SIGNIFICANT CHANGE UP (ref 3.8–5.2)
RBC # FLD: 12.4 % — SIGNIFICANT CHANGE UP (ref 10.3–14.5)
SODIUM SERPL-SCNC: 135 MMOL/L — SIGNIFICANT CHANGE UP (ref 135–145)
URATE SERPL-MCNC: 3.9 MG/DL — SIGNIFICANT CHANGE UP (ref 2.5–7)
WBC # BLD: 12.45 K/UL — HIGH (ref 3.8–10.5)
WBC # FLD AUTO: 12.45 K/UL — HIGH (ref 3.8–10.5)

## 2022-01-27 PROCEDURE — 99232 SBSQ HOSP IP/OBS MODERATE 35: CPT | Mod: GC

## 2022-01-27 RX ADMIN — Medication 6 MILLIGRAM(S): at 22:01

## 2022-01-27 RX ADMIN — Medication 1 TABLET(S): at 09:42

## 2022-01-27 RX ADMIN — HEPARIN SODIUM 5000 UNIT(S): 5000 INJECTION INTRAVENOUS; SUBCUTANEOUS at 22:01

## 2022-01-27 RX ADMIN — Medication 90 MILLIGRAM(S): at 09:42

## 2022-01-27 RX ADMIN — HEPARIN SODIUM 5000 UNIT(S): 5000 INJECTION INTRAVENOUS; SUBCUTANEOUS at 09:42

## 2022-01-27 NOTE — PROGRESS NOTE ADULT - ASSESSMENT
25 yo  at 24w1d admitted with severe range blood pressures and proteinuria meeting criteria for sPEC.  Additionally, fetus found to be of a non-viable weight with AEDV on UAD overall indicating poor prognosis. Patient counseled on diagnosis of sPEC and options at this age including, proceeding with pregnancy with understanding of risks of severe prematurity or interruption of pregnancy.  Patient is electing to continue to the pregnancy after adequate counseling.     #sPEC  -s/p magnesium x24 hours for seizure ppx  -HELLP labs qD, AM labs pending   -initial abnormal lab value of P/C= 1.3, all other labs wnl.   -c/w Procardia 90 XL for stabilization of BPs    #Fetal well being  -daily FH check with BPP 1x/wk  - Will defer BMZ at this time unless clinical change  - ATU (): variable, posterior, AEDV, MCA wnl  -s/p NICU consultation , patient expressed understanding of the risks of severe prematurity  - f/u GBS ()  -MOD to be determined based on clinical situation at that time.    #Maternal well being  -regular diet  -HSQ, SCDs for DVT ppx    Mason Messer PGY3    27 yo  at 24w1d admitted with severe range blood pressures and proteinuria meeting criteria for sPEC.  Additionally, fetus found to be of a non-viable weight with AEDV on UAD overall indicating poor prognosis. Patient counseled on diagnosis of sPEC and options at this age including, proceeding with pregnancy with understanding of risks of severe prematurity or interruption of pregnancy.  Patient is electing to continue to the pregnancy after adequate counseling.     #sPEC  -s/p magnesium x24 hours for seizure ppx  -HELLP labs qD, AM labs pending   -initial abnormal lab value of P/C= 1.3, all other labs wnl.   -c/w Procardia 90 XL for stabilization of BPs    #Fetal well being  -daily FH check with BPP 1x/wk  - Will defer BMZ at this time unless clinical change  - ATU (): variable, posterior, AEDV, MCA wnl  -s/p NICU consultation , patient expressed understanding of the risks of severe prematurity  - f/u GBS ()  -MOD to be determined based on clinical situation at that time.    #Maternal well being  -regular diet  -HSQ, SCDs for DVT ppx    Mason Messer PGY3       MFM Fellow Note     Patient is a 26 yr  24 1/7 weeks with preeclampsia with severe features and FGR (355g). Patients blood presurgery are well controlled on Procardia 60mg XL. She has no PEC symptoms and HELLP labs WNL. Secondary to poor fetal prognosis, patient does not desire fetal monitoring at this time. After the next growth sonogram is complete which will be on 22, she would decide at what weight and/or gestational age she would like fetal monitoring and initiating of BMZ.     She understands by not monitoring fetus, she is at risk for a stillbirth. If delivery is required for maternal indication prior to next sonogram/decision for monitoring she will decide at that time what mode of delivery and/or fetal monitoring she would prefer.       Will continue to monitor patient closely inpatient for worsening preeclampsia symptoms including blood pressure, PEC symptoms and serial HELLP labs (will space out to q3 days). Will only perform fetal heart rate check until patient desires fetal monitoring based on next growth sonogram.     Patient seen with and evaluated with Dr. Heard (M Attending)     Silvestre Horta MD   M Fellow

## 2022-01-27 NOTE — PROGRESS NOTE ADULT - SUBJECTIVE AND OBJECTIVE BOX
R3 Antepartum Note    Patient seen and examined at bedside, no acute overnight events. No acute complaints. Pt reports +FM, denies LOF, VB, ctx, HA, epigastric pain, blurred vision, CP, SOB, N/V, fevers, and chills.    Vital Signs Last 24 Hours  T(C): 37.1 (01-27-22 @ 01:42), Max: 37.1 (01-27-22 @ 01:42)  HR: 111 (01-27-22 @ 01:42) (93 - 114)  BP: 136/87 (01-27-22 @ 01:42) (123/79 - 136/87)  RR: 18 (01-27-22 @ 01:42) (16 - 20)  SpO2: 100% (01-27-22 @ 01:42) (100% - 100%)    Physical Exam:  General: NAD  Abdomen: Soft, non-tender, gravid  Ext: No pain or swelling    Labs:             14.9   11.44 )-----------( 303      ( 01-26 @ 06:53 )             44.7     01-26 @ 06:53    135  |  102  |  6   ----------------------------<  80  3.9   |  20  |  0.57    Ca    9.4      01-26 @ 06:53    TPro  7.4  /  Alb  3.8  /  TBili  0.4  /  DBili  x   /  AST  23  /  ALT  27  /  AlkPhos  106  01-26 @ 06:53    PT/INR - ( 01-26 @ 06:53 )   PT: 11.5 sec;   INR: 1.00 ratio    PTT - ( 01-26 @ 06:53 )  PTT:36.3 sec    Uric Acid: (01-26 @ 06:53)  4.0      Fibrinogen: (01-26 @ 06:53)  624      LDH: (01-26 @ 06:53)  189        MEDICATIONS  (STANDING):  heparin   Injectable 5000 Unit(s) SubCutaneous every 12 hours  melatonin 6 milliGRAM(s) Oral at bedtime  NIFEdipine XL 90 milliGRAM(s) Oral daily  prenatal multivitamin 1 Tablet(s) Oral daily    MEDICATIONS  (PRN):   R3 Antepartum Note    Patient seen and examined at bedside, no acute overnight events. No acute complaints. Pt reports +FM, denies LOF, VB, ctx, HA, epigastric pain, blurred vision, CP, SOB, N/V, fevers, and chills.    Vital Signs Last 24 Hours  T(C): 37.1 (01-27-22 @ 01:42), Max: 37.1 (01-27-22 @ 01:42)  HR: 111 (01-27-22 @ 01:42) (93 - 114)  BP: 136/87 (01-27-22 @ 01:42) (123/79 - 136/87)  RR: 18 (01-27-22 @ 01:42) (16 - 20)  SpO2: 100% (01-27-22 @ 01:42) (100% - 100%)    Physical Exam:  General: NAD  Abdomen: Soft, non-tender, gravid  Ext: No pain or swelling    Labs:             14.9   11.44 )-----------( 303      ( 01-26 @ 06:53 )             44.7     01-26 @ 06:53    135  |  102  |  6   ----------------------------<  80  3.9   |  20  |  0.57    Ca    9.4      01-26 @ 06:53    TPro  7.4  /  Alb  3.8  /  TBili  0.4  /  DBili  x   /  AST  23  /  ALT  27  /  AlkPhos  106  01-26 @ 06:53    PT/INR - ( 01-26 @ 06:53 )   PT: 11.5 sec;   INR: 1.00 ratio    PTT - ( 01-26 @ 06:53 )  PTT:36.3 sec    Uric Acid: (01-26 @ 06:53)  4.0      Fibrinogen: (01-26 @ 06:53)  624      LDH: (01-26 @ 06:53)  189        MEDICATIONS  (STANDING):  heparin   Injectable 5000 Unit(s) SubCutaneous every 12 hours  melatonin 6 milliGRAM(s) Oral at bedtime  NIFEdipine XL 90 milliGRAM(s) Oral daily  prenatal multivitamin 1 Tablet(s) Oral daily

## 2022-01-28 RX ADMIN — HEPARIN SODIUM 5000 UNIT(S): 5000 INJECTION INTRAVENOUS; SUBCUTANEOUS at 10:27

## 2022-01-28 RX ADMIN — Medication 6 MILLIGRAM(S): at 22:33

## 2022-01-28 RX ADMIN — Medication 1 TABLET(S): at 10:27

## 2022-01-28 RX ADMIN — Medication 90 MILLIGRAM(S): at 10:28

## 2022-01-28 RX ADMIN — HEPARIN SODIUM 5000 UNIT(S): 5000 INJECTION INTRAVENOUS; SUBCUTANEOUS at 22:35

## 2022-01-28 NOTE — PROGRESS NOTE ADULT - ASSESSMENT
25 yo  at 24w2d admitted with severe range blood pressures and proteinuria meeting criteria for sPEC.  Additionally, fetus found to be of a non-viable weight with AEDV on UAD overall indicating poor prognosis. Patient counseled on diagnosis of sPEC and options at this age including, proceeding with pregnancy with understanding of risks of severe prematurity or interruption of pregnancy.  Patient is electing to continue to the pregnancy after adequate counseling.     #sPEC  -s/p magnesium x24 hours for seizure ppx  -HELLP labs qD, AM labs pending   -initial abnormal lab value of P/C= 1.3, all other labs wnl.   -c/w Procardia 90 XL for stabilization of BPs    #Fetal well being  -daily FH check  - Next Growth Scan  - Will defer BMZ at this time unless clinical change  - ATU (): variable, posterior, AEDV, MCA wnl  -s/p NICU consultation , patient expressed understanding of the risks of severe prematurity  - f/u GBS ()  -MOD to be determined based on clinical situation at that time.    #Maternal well being  -regular diet  -HSQ, SCDs for DVT ppx    Mason Messer PGY3    25 yo  at 24w2d admitted with severe range blood pressures and proteinuria meeting criteria for sPEC.  Additionally, fetus found to be of a non-viable weight with AEDV on UAD overall indicating poor prognosis. Patient counseled on diagnosis of sPEC and options at this age including, proceeding with pregnancy with understanding of risks of severe prematurity or interruption of pregnancy.  Patient is electing to continue to the pregnancy after adequate counseling.     #sPEC  -s/p magnesium x24 hours for seizure ppx  -HELLP labs qD, AM labs pending   -initial abnormal lab value of P/C= 1.3, all other labs wnl.   -c/w Procardia 90 XL for stabilization of BPs    #Fetal well being  -daily FH check  - Next Growth Scan  - Will defer BMZ at this time unless clinical change  - ATU (): variable, posterior, AEDV, MCA wnl  -s/p NICU consultation , patient expressed understanding of the risks of severe prematurity  - f/u GBS ()  -MOD to be determined based on clinical situation at that time.    #Maternal well being  -regular diet  -HSQ, SCDs for DVT ppx    Mason Messer PGY3     MFM Fellow Note     Patient is a 26 yr  24 2/7 weeks with preeclampsia with severe features and FGR (355g). Patients blood pressures continue to be well controlled on Procardia 90mg XL. She has no PEC symptoms and HELLP labs WNL.     Secondary to poor fetal prognosis, patient does not desire fetal monitoring at this time. After the next growth sonogram is complete which will be on 22, she would decide at what weight and/or gestational age she would like fetal monitoring and initiating of BMZ. She continues to understands by not monitoring fetus, she is at risk for a stillbirth.    If delivery is required for maternal indication prior to next sonogram/decision for monitoring she will decide at that time what mode of delivery and/or fetal monitoring she would prefer.     Will continue to monitor patient closely inpatient for worsening preeclampsia symptoms including blood pressure, PEC symptoms and serial q3 days HELLP labs. Will only perform fetal heart rate check until patient desires fetal monitoring based on next growth sonogram.     Patient seen with and evaluated with Dr. Longoria (MFM Attending)     Silvestre Horta MD   MFM Fellow        MFM Fellow

## 2022-01-28 NOTE — PROGRESS NOTE ADULT - SUBJECTIVE AND OBJECTIVE BOX
R3 Antepartum Note,    Patient seen and examined at bedside, no acute overnight events. No acute complaints. Pt reports +FM, denies LOF, VB, ctx, HA, epigastric pain, blurred vision, CP, SOB, N/V, fevers, and chills.    Vital Signs Last 24 Hours  T(C): 36.9 (01-28-22 @ 05:38), Max: 36.9 (01-28-22 @ 05:38)  HR: 85 (01-28-22 @ 05:38) (85 - 105)  BP: 132/86 (01-28-22 @ 05:38) (126/85 - 134/84)  RR: 17 (01-28-22 @ 05:38) (12 - 18)  SpO2: 98% (01-28-22 @ 05:38) (98% - 100%)    Physical Exam:  General: NAD  Abdomen: Soft, non-tender, gravid  Ext: No pain or swelling    Labs:             14.6   12.45 )-----------( 256      ( 01-27 @ 06:55 )             42.3     01-27 @ 06:55    135  |  100  |  7   ----------------------------<  72  4.1   |  22  |  0.65    Ca    9.2      01-27 @ 06:55    TPro  6.9  /  Alb  3.7  /  TBili  0.4  /  DBili  x   /  AST  21  /  ALT  23  /  AlkPhos  103  01-27 @ 06:55    PT/INR - ( 01-27 @ 06:55 )   PT: 11.8 sec;   INR: 1.04 ratio    PTT - ( 01-27 @ 06:55 )  PTT:33.3 sec    Uric Acid: (01-27 @ 06:55)  3.9      Fibrinogen: (01-27 @ 06:55)  586      LDH: (01-27 @ 06:55)  184        MEDICATIONS  (STANDING):  heparin   Injectable 5000 Unit(s) SubCutaneous every 12 hours  melatonin 6 milliGRAM(s) Oral at bedtime  NIFEdipine XL 90 milliGRAM(s) Oral daily  prenatal multivitamin 1 Tablet(s) Oral daily

## 2022-01-29 LAB
BLD GP AB SCN SERPL QL: NEGATIVE — SIGNIFICANT CHANGE UP
CULTURE RESULTS: SIGNIFICANT CHANGE UP
RH IG SCN BLD-IMP: POSITIVE — SIGNIFICANT CHANGE UP
SPECIMEN SOURCE: SIGNIFICANT CHANGE UP

## 2022-01-29 RX ADMIN — HEPARIN SODIUM 5000 UNIT(S): 5000 INJECTION INTRAVENOUS; SUBCUTANEOUS at 21:01

## 2022-01-29 RX ADMIN — Medication 1 TABLET(S): at 09:39

## 2022-01-29 RX ADMIN — Medication 90 MILLIGRAM(S): at 09:39

## 2022-01-29 RX ADMIN — HEPARIN SODIUM 5000 UNIT(S): 5000 INJECTION INTRAVENOUS; SUBCUTANEOUS at 09:39

## 2022-01-29 RX ADMIN — Medication 6 MILLIGRAM(S): at 21:01

## 2022-01-29 NOTE — PROGRESS NOTE ADULT - SUBJECTIVE AND OBJECTIVE BOX
R3 Antepartum Note, HD#11    Interval events:    Patient seen and examined at bedside, no acute overnight events.   No acute complaints.   Pt reports +FM, denies LOF, VB, ctx, HA, epigastric pain, blurred vision, CP, SOB, N/V, fevers, and chills.    Vital Signs Last 24 Hours  T(C): 36.6 (01-29-22 @ 01:25), Max: 36.9 (01-28-22 @ 05:38)  HR: 102 (01-29-22 @ 01:25) (85 - 102)  BP: 123/78 (01-29-22 @ 01:25) (108/73 - 139/89)  RR: 18 (01-29-22 @ 01:25) (16 - 18)  SpO2: 100% (01-29-22 @ 01:25) (81% - 100%)    CAPILLARY BLOOD GLUCOSE          Physical Exam:  General: NAD  Abdomen: Soft, non-tender, gravid  Ext: No pain or swelling    NST reactive overnight    Labs:             14.6   12.45 )-----------( 256      ( 01-27 @ 06:55 )             42.3     01-27 @ 06:55    135  |  100  |  7   ----------------------------<  72  4.1   |  22  |  0.65    Ca    9.2      01-27 @ 06:55    TPro  6.9  /  Alb  3.7  /  TBili  0.4  /  DBili  x   /  AST  21  /  ALT  23  /  AlkPhos  103  01-27 @ 06:55    PT/INR - ( 01-27 @ 06:55 )   PT: 11.8 sec;   INR: 1.04 ratio    PTT - ( 01-27 @ 06:55 )  PTT:33.3 sec    Uric Acid: (01-27 @ 06:55)  3.9      Fibrinogen: (01-27 @ 06:55)  586      LDH: (01-27 @ 06:55)  184        MEDICATIONS  (STANDING):  heparin   Injectable 5000 Unit(s) SubCutaneous every 12 hours  melatonin 6 milliGRAM(s) Oral at bedtime  NIFEdipine XL 90 milliGRAM(s) Oral daily  prenatal multivitamin 1 Tablet(s) Oral daily    MEDICATIONS  (PRN):

## 2022-01-29 NOTE — PROGRESS NOTE ADULT - SUBJECTIVE AND OBJECTIVE BOX
M follow up note:  PAtient seen and examined  HAs no complains  BP has been within normal range  No signs of severe preeclampsia  Patient wants to continue with her previous plan of monitoring BP and only FHR checks at [facility].  Will repeat scan for growth next week

## 2022-01-29 NOTE — PROGRESS NOTE ADULT - ASSESSMENT
25 yo  at 24w3d admitted with severe range blood pressures and proteinuria meeting criteria for sPEC.  Additionally, fetus found to be of a non-viable weight with AEDV on UAD overall indicating poor prognosis. Patient counseled on diagnosis of sPEC and options at this age including, proceeding with pregnancy with understanding of risks of severe prematurity or interruption of pregnancy.  Patient is electing to continue to the pregnancy after adequate counseling.     #sPEC  - s/p magnesium x24 hours for seizure ppx  - HELLP labs qD, AM labs pending   - initial abnormal lab value of P/C= 1.3, all other labs wnl.   - c/w Procardia 90 XL for stabilization of BPs    #Fetal well being  - daily FH check  - Next Growth Scan   - Will defer BMZ at this time unless clinical change  - ATU (): variable, posterior, AEDV, MCA wnl  - s/p NICU consultation , patient expressed understanding of the risks of severe prematurity  - f/u GBS ()  - MOD to be determined based on clinical situation at that time.    #Maternal well being  - regular diet  - HSQ, SCDs for DVT ppx    Joo Villafana, PGY3

## 2022-01-30 LAB
ALBUMIN SERPL ELPH-MCNC: 3.6 G/DL — SIGNIFICANT CHANGE UP (ref 3.3–5)
ALP SERPL-CCNC: 108 U/L — SIGNIFICANT CHANGE UP (ref 40–120)
ALT FLD-CCNC: 22 U/L — SIGNIFICANT CHANGE UP (ref 4–33)
ANION GAP SERPL CALC-SCNC: 13 MMOL/L — SIGNIFICANT CHANGE UP (ref 7–14)
APTT BLD: 34.3 SEC — SIGNIFICANT CHANGE UP (ref 27–36.3)
AST SERPL-CCNC: 19 U/L — SIGNIFICANT CHANGE UP (ref 4–32)
BASOPHILS # BLD AUTO: 0.05 K/UL — SIGNIFICANT CHANGE UP (ref 0–0.2)
BASOPHILS NFR BLD AUTO: 0.5 % — SIGNIFICANT CHANGE UP (ref 0–2)
BILIRUB SERPL-MCNC: 0.2 MG/DL — SIGNIFICANT CHANGE UP (ref 0.2–1.2)
BUN SERPL-MCNC: 7 MG/DL — SIGNIFICANT CHANGE UP (ref 7–23)
CALCIUM SERPL-MCNC: 9.2 MG/DL — SIGNIFICANT CHANGE UP (ref 8.4–10.5)
CHLORIDE SERPL-SCNC: 102 MMOL/L — SIGNIFICANT CHANGE UP (ref 98–107)
CO2 SERPL-SCNC: 20 MMOL/L — LOW (ref 22–31)
CREAT SERPL-MCNC: 0.64 MG/DL — SIGNIFICANT CHANGE UP (ref 0.5–1.3)
EOSINOPHIL # BLD AUTO: 0.18 K/UL — SIGNIFICANT CHANGE UP (ref 0–0.5)
EOSINOPHIL NFR BLD AUTO: 1.8 % — SIGNIFICANT CHANGE UP (ref 0–6)
FIBRINOGEN PPP-MCNC: 648 MG/DL — HIGH (ref 290–520)
GLUCOSE SERPL-MCNC: 97 MG/DL — SIGNIFICANT CHANGE UP (ref 70–99)
HCT VFR BLD CALC: 42.8 % — SIGNIFICANT CHANGE UP (ref 34.5–45)
HGB BLD-MCNC: 14.2 G/DL — SIGNIFICANT CHANGE UP (ref 11.5–15.5)
IANC: 6.08 K/UL — SIGNIFICANT CHANGE UP (ref 1.5–8.5)
IMM GRANULOCYTES NFR BLD AUTO: 0.2 % — SIGNIFICANT CHANGE UP (ref 0–1.5)
INR BLD: 1.02 RATIO — SIGNIFICANT CHANGE UP (ref 0.88–1.16)
LDH SERPL L TO P-CCNC: 166 U/L — SIGNIFICANT CHANGE UP (ref 135–225)
LYMPHOCYTES # BLD AUTO: 2.85 K/UL — SIGNIFICANT CHANGE UP (ref 1–3.3)
LYMPHOCYTES # BLD AUTO: 28.4 % — SIGNIFICANT CHANGE UP (ref 13–44)
MCHC RBC-ENTMCNC: 29.4 PG — SIGNIFICANT CHANGE UP (ref 27–34)
MCHC RBC-ENTMCNC: 33.2 GM/DL — SIGNIFICANT CHANGE UP (ref 32–36)
MCV RBC AUTO: 88.6 FL — SIGNIFICANT CHANGE UP (ref 80–100)
MONOCYTES # BLD AUTO: 0.87 K/UL — SIGNIFICANT CHANGE UP (ref 0–0.9)
MONOCYTES NFR BLD AUTO: 8.7 % — SIGNIFICANT CHANGE UP (ref 2–14)
NEUTROPHILS # BLD AUTO: 6.08 K/UL — SIGNIFICANT CHANGE UP (ref 1.8–7.4)
NEUTROPHILS NFR BLD AUTO: 60.4 % — SIGNIFICANT CHANGE UP (ref 43–77)
NRBC # BLD: 0 /100 WBCS — SIGNIFICANT CHANGE UP
NRBC # FLD: 0 K/UL — SIGNIFICANT CHANGE UP
PLATELET # BLD AUTO: 244 K/UL — SIGNIFICANT CHANGE UP (ref 150–400)
POTASSIUM SERPL-MCNC: 4 MMOL/L — SIGNIFICANT CHANGE UP (ref 3.5–5.3)
POTASSIUM SERPL-SCNC: 4 MMOL/L — SIGNIFICANT CHANGE UP (ref 3.5–5.3)
PROT SERPL-MCNC: 6.5 G/DL — SIGNIFICANT CHANGE UP (ref 6–8.3)
PROTHROM AB SERPL-ACNC: 11.7 SEC — SIGNIFICANT CHANGE UP (ref 10.6–13.6)
RBC # BLD: 4.83 M/UL — SIGNIFICANT CHANGE UP (ref 3.8–5.2)
RBC # FLD: 12.3 % — SIGNIFICANT CHANGE UP (ref 10.3–14.5)
SODIUM SERPL-SCNC: 135 MMOL/L — SIGNIFICANT CHANGE UP (ref 135–145)
URATE SERPL-MCNC: 4.4 MG/DL — SIGNIFICANT CHANGE UP (ref 2.5–7)
WBC # BLD: 10.05 K/UL — SIGNIFICANT CHANGE UP (ref 3.8–10.5)
WBC # FLD AUTO: 10.05 K/UL — SIGNIFICANT CHANGE UP (ref 3.8–10.5)

## 2022-01-30 RX ADMIN — Medication 6 MILLIGRAM(S): at 21:04

## 2022-01-30 RX ADMIN — HEPARIN SODIUM 5000 UNIT(S): 5000 INJECTION INTRAVENOUS; SUBCUTANEOUS at 09:09

## 2022-01-30 RX ADMIN — HEPARIN SODIUM 5000 UNIT(S): 5000 INJECTION INTRAVENOUS; SUBCUTANEOUS at 21:05

## 2022-01-30 RX ADMIN — Medication 90 MILLIGRAM(S): at 09:09

## 2022-01-30 RX ADMIN — Medication 1 TABLET(S): at 09:09

## 2022-01-30 NOTE — PROGRESS NOTE ADULT - SUBJECTIVE AND OBJECTIVE BOX
R3 Antepartum Note, HD#12    Interval events: Patient seen and examined at bedside, no acute overnight events. No acute complaints. Pt reports +FM, denies LOF, VB, ctx, HA, epigastric pain, blurred vision, CP, SOB, N/V, fevers, or chills.    Vital Signs Last 24 Hours  T(C): 36.9 (01-30-22 @ 05:40), Max: 36.9 (01-29-22 @ 21:04)  HR: 99 (01-30-22 @ 05:40) (95 - 114)  BP: 140/82 (01-30-22 @ 05:40) (126/82 - 144/93)  RR: 17 (01-30-22 @ 05:40) (17 - 18)  SpO2: 100% (01-30-22 @ 05:40) (97% - 100%)      Physical Exam:  General: NAD  Abdomen: Soft, non-tender, gravid  Ext: No pain or swelling      Labs:        PT/INR - ( 01-27 @ 06:55 )   PT: 11.8 sec;   INR: 1.04 ratio    PTT - ( 01-27 @ 06:55 )  PTT:33.3 sec    Uric Acid: (01-27 @ 06:55)  3.9      Fibrinogen: (01-27 @ 06:55)  586      LDH: (01-27 @ 06:55)  184        MEDICATIONS  (STANDING):  heparin   Injectable 5000 Unit(s) SubCutaneous every 12 hours  melatonin 6 milliGRAM(s) Oral at bedtime  NIFEdipine XL 90 milliGRAM(s) Oral daily  prenatal multivitamin 1 Tablet(s) Oral daily

## 2022-01-30 NOTE — PROGRESS NOTE ADULT - ASSESSMENT
A/P: 25yo P0 at 24w4d a/w sPEC and severe growth restriction. VSS. BP well controlled on procardia 90. No s/s of worsening sPEC. plan for repeat growth this week. deferring NST for now per pt request as previously documented. Will cnt to monitor closely.    Patient seen with Dr. Longoria (M attending)    Bobby Franco M.D. PGY-5  Maternal Fetal Medicine Fellow

## 2022-01-30 NOTE — PROGRESS NOTE ADULT - SUBJECTIVE AND OBJECTIVE BOX
MFM Fellow Progress Note      S: doing well this morning no complaints +FM denies lof/vb/ctx denies cp/sob/ha/cov/n/v    O:  ICU Vital Signs Last 24 Hrs  T(C): 36.8 (30 Jan 2022 09:03), Max: 36.9 (29 Jan 2022 21:04)  T(F): 98.2 (30 Jan 2022 09:03), Max: 98.5 (29 Jan 2022 21:04)  HR: 92 (30 Jan 2022 09:03) (92 - 114)  BP: 131/78 (30 Jan 2022 09:03) (126/82 - 144/93)  BP(mean): --  ABP: --  ABP(mean): --  RR: 16 (30 Jan 2022 09:03) (16 - 18)  SpO2: 99% (30 Jan 2022 09:03) (97% - 100%)  Gen: well appearing      FHR+

## 2022-01-30 NOTE — PROGRESS NOTE ADULT - ASSESSMENT
25 yo  at 24w4d admitted with severe range blood pressures and proteinuria meeting criteria for sPEC.  Additionally, fetus found to be of a non-viable weight with AEDV on UAD overall indicating poor prognosis. Patient counseled on diagnosis of sPEC and options at this age including, proceeding with pregnancy with understanding of risks of severe prematurity or interruption of pregnancy.  Patient is electing to continue to the pregnancy after adequate counseling.     #sPEC  - s/p magnesium x24 hours for seizure ppx  - HELLP labs prn  - initial abnormal lab value of P/C= 1.3, all other labs wnl.   - c/w Procardia 90 XL for stabilization of BPs    #Fetal well being  - daily FH check  - Next Growth Scan   - Will defer BMZ at this time unless clinical change  - ATU (): variable, posterior, AEDV, MCA wnl  - s/p NICU consultation , patient expressed understanding of the risks of severe prematurity  - GBS pos ()  - MOD to be determined based on clinical situation at that time.    #Maternal well being  - regular diet  - HSQ, SCDs for DVT ppx      Ban Mao MD  OBGYN PGY3

## 2022-01-31 PROCEDURE — 99232 SBSQ HOSP IP/OBS MODERATE 35: CPT | Mod: GC

## 2022-01-31 RX ADMIN — Medication 1 TABLET(S): at 09:31

## 2022-01-31 RX ADMIN — Medication 90 MILLIGRAM(S): at 09:32

## 2022-01-31 RX ADMIN — Medication 6 MILLIGRAM(S): at 21:41

## 2022-01-31 RX ADMIN — HEPARIN SODIUM 5000 UNIT(S): 5000 INJECTION INTRAVENOUS; SUBCUTANEOUS at 09:31

## 2022-01-31 RX ADMIN — HEPARIN SODIUM 5000 UNIT(S): 5000 INJECTION INTRAVENOUS; SUBCUTANEOUS at 21:40

## 2022-01-31 NOTE — PROGRESS NOTE ADULT - SUBJECTIVE AND OBJECTIVE BOX
R3 Antepartum Note, HD#13    Patient seen and examined at bedside, no acute overnight events. No acute complaints. Pt reports +FM, denies LOF, VB, ctx, HA, epigastric pain, blurred vision, CP, SOB, N/V, fevers, and chills.    Vital Signs Last 24 Hours  T(C): 36.8 (01-31-22 @ 01:06), Max: 37.2 (01-30-22 @ 21:08)  HR: 96 (01-31-22 @ 01:06) (92 - 109)  BP: 128/80 (01-31-22 @ 01:06) (110/70 - 132/85)  RR: 18 (01-30-22 @ 21:08) (16 - 18)  SpO2: 99% (01-30-22 @ 21:12) (99% - 100%)    Physical Exam:  General: NAD  Abdomen: Soft, non-tender, gravid  Ext: No pain or swelling    Labs:             14.2   10.05 )-----------( 244      ( 01-30 @ 08:30 )             42.8     01-30 @ 08:30    135  |  102  |  7   ----------------------------<  97  4.0   |  20  |  0.64    Ca    9.2      01-30 @ 08:30    TPro  6.5  /  Alb  3.6  /  TBili  0.2  /  DBili  x   /  AST  19  /  ALT  22  /  AlkPhos  108  01-30 @ 08:30    PT/INR - ( 01-30 @ 08:30 )   PT: 11.7 sec;   INR: 1.02 ratio    PTT - ( 01-30 @ 08:30 )  PTT:34.3 sec    Uric Acid: (01-30 @ 08:30)  4.4      Fibrinogen: (01-30 @ 08:30)  648      LDH: (01-30 @ 08:30)  166        MEDICATIONS  (STANDING):  heparin   Injectable 5000 Unit(s) SubCutaneous every 12 hours  melatonin 6 milliGRAM(s) Oral at bedtime  NIFEdipine XL 90 milliGRAM(s) Oral daily  prenatal multivitamin 1 Tablet(s) Oral daily

## 2022-01-31 NOTE — PROGRESS NOTE ADULT - ASSESSMENT
27 yo  at 24w5d admitted with severe range blood pressures and proteinuria meeting criteria for sPEC.  Additionally, fetus found to be of a non-viable weight with AEDV on UAD overall indicating poor prognosis. Patient counseled on diagnosis of sPEC and options at this age including, proceeding with pregnancy with understanding of risks of severe prematurity or interruption of pregnancy.  Patient is electing to continue to the pregnancy after adequate counseling.     #sPEC  - s/p magnesium x24 hours for seizure ppx  - HELLP labs prn  - initial abnormal lab value of P/C= 1.3, all other labs wnl.   - c/w Procardia 90 XL for stabilization of BPs    #Fetal well being  - daily FH check  - Next Growth Scan   - Will defer BMZ at this time unless clinical change  - ATU (): variable, posterior, AEDV, MCA wnl  - s/p NICU consultation , patient expressed understanding of the risks of severe prematurity  - GBS pos ()  - MOD to be determined based on clinical situation at that time.    #Maternal well being  - regular diet  - HSQ, SCDs for DVT ppx    Mason Messer PGY3  27 yo  at 24w5d admitted with severe range blood pressures and proteinuria meeting criteria for sPEC.  Additionally, fetus found to be of a non-viable weight with AEDV on UAD overall indicating poor prognosis. Patient counseled on diagnosis of sPEC and options at this age including, proceeding with pregnancy with understanding of risks of severe prematurity or interruption of pregnancy.  Patient is electing to continue to the pregnancy after adequate counseling.     #sPEC  - s/p magnesium x24 hours for seizure ppx  - HELLP labs prn  - initial abnormal lab value of P/C= 1.3, all other labs wnl.   - c/w Procardia 90 XL for stabilization of BPs    #Fetal well being  - daily FH check  - Next Growth Scan  - will revisit patient's desires for fetal monitoring and delivery at that time.   - Will defer BMZ at this time unless clinical change  - ATU (): variable, posterior, AEDV, MCA wnl  - s/p NICU consultation , patient expressed understanding of the risks of severe prematurity  - GBS pos ()  - MOD to be determined based on clinical situation at that time.    #Maternal well being  - regular diet  - HSQ, SCDs for DVT ppx    Mason Messer PGY3

## 2022-02-01 LAB
ALBUMIN SERPL ELPH-MCNC: 3.7 G/DL — SIGNIFICANT CHANGE UP (ref 3.3–5)
ALP SERPL-CCNC: 116 U/L — SIGNIFICANT CHANGE UP (ref 40–120)
ALT FLD-CCNC: 19 U/L — SIGNIFICANT CHANGE UP (ref 4–33)
ANION GAP SERPL CALC-SCNC: 14 MMOL/L — SIGNIFICANT CHANGE UP (ref 7–14)
APTT BLD: 36.2 SEC — SIGNIFICANT CHANGE UP (ref 27–36.3)
AST SERPL-CCNC: 19 U/L — SIGNIFICANT CHANGE UP (ref 4–32)
BASOPHILS # BLD AUTO: 0.08 K/UL — SIGNIFICANT CHANGE UP (ref 0–0.2)
BASOPHILS NFR BLD AUTO: 0.7 % — SIGNIFICANT CHANGE UP (ref 0–2)
BILIRUB SERPL-MCNC: 0.3 MG/DL — SIGNIFICANT CHANGE UP (ref 0.2–1.2)
BLD GP AB SCN SERPL QL: NEGATIVE — SIGNIFICANT CHANGE UP
BUN SERPL-MCNC: 7 MG/DL — SIGNIFICANT CHANGE UP (ref 7–23)
CALCIUM SERPL-MCNC: 9.2 MG/DL — SIGNIFICANT CHANGE UP (ref 8.4–10.5)
CHLORIDE SERPL-SCNC: 101 MMOL/L — SIGNIFICANT CHANGE UP (ref 98–107)
CO2 SERPL-SCNC: 21 MMOL/L — LOW (ref 22–31)
CREAT SERPL-MCNC: 0.66 MG/DL — SIGNIFICANT CHANGE UP (ref 0.5–1.3)
EOSINOPHIL # BLD AUTO: 0.2 K/UL — SIGNIFICANT CHANGE UP (ref 0–0.5)
EOSINOPHIL NFR BLD AUTO: 1.8 % — SIGNIFICANT CHANGE UP (ref 0–6)
FIBRINOGEN PPP-MCNC: 614 MG/DL — HIGH (ref 290–520)
GLUCOSE SERPL-MCNC: 66 MG/DL — LOW (ref 70–99)
HCT VFR BLD CALC: 40.8 % — SIGNIFICANT CHANGE UP (ref 34.5–45)
HGB BLD-MCNC: 14.4 G/DL — SIGNIFICANT CHANGE UP (ref 11.5–15.5)
IANC: 6.41 K/UL — SIGNIFICANT CHANGE UP (ref 1.5–8.5)
IMM GRANULOCYTES NFR BLD AUTO: 0.5 % — SIGNIFICANT CHANGE UP (ref 0–1.5)
INR BLD: 1 RATIO — SIGNIFICANT CHANGE UP (ref 0.88–1.16)
LDH SERPL L TO P-CCNC: 190 U/L — SIGNIFICANT CHANGE UP (ref 135–225)
LYMPHOCYTES # BLD AUTO: 29.6 % — SIGNIFICANT CHANGE UP (ref 13–44)
LYMPHOCYTES # BLD AUTO: 3.26 K/UL — SIGNIFICANT CHANGE UP (ref 1–3.3)
MCHC RBC-ENTMCNC: 30.2 PG — SIGNIFICANT CHANGE UP (ref 27–34)
MCHC RBC-ENTMCNC: 35.3 GM/DL — SIGNIFICANT CHANGE UP (ref 32–36)
MCV RBC AUTO: 85.5 FL — SIGNIFICANT CHANGE UP (ref 80–100)
MONOCYTES # BLD AUTO: 0.99 K/UL — HIGH (ref 0–0.9)
MONOCYTES NFR BLD AUTO: 9 % — SIGNIFICANT CHANGE UP (ref 2–14)
NEUTROPHILS # BLD AUTO: 6.41 K/UL — SIGNIFICANT CHANGE UP (ref 1.8–7.4)
NEUTROPHILS NFR BLD AUTO: 58.4 % — SIGNIFICANT CHANGE UP (ref 43–77)
NRBC # BLD: 0 /100 WBCS — SIGNIFICANT CHANGE UP
NRBC # FLD: 0 K/UL — SIGNIFICANT CHANGE UP
PLATELET # BLD AUTO: 267 K/UL — SIGNIFICANT CHANGE UP (ref 150–400)
POTASSIUM SERPL-MCNC: 3.9 MMOL/L — SIGNIFICANT CHANGE UP (ref 3.5–5.3)
POTASSIUM SERPL-SCNC: 3.9 MMOL/L — SIGNIFICANT CHANGE UP (ref 3.5–5.3)
PROT SERPL-MCNC: 7.1 G/DL — SIGNIFICANT CHANGE UP (ref 6–8.3)
PROTHROM AB SERPL-ACNC: 11.5 SEC — SIGNIFICANT CHANGE UP (ref 10.6–13.6)
RBC # BLD: 4.77 M/UL — SIGNIFICANT CHANGE UP (ref 3.8–5.2)
RBC # FLD: 12.4 % — SIGNIFICANT CHANGE UP (ref 10.3–14.5)
RH IG SCN BLD-IMP: POSITIVE — SIGNIFICANT CHANGE UP
SODIUM SERPL-SCNC: 136 MMOL/L — SIGNIFICANT CHANGE UP (ref 135–145)
URATE SERPL-MCNC: 3.9 MG/DL — SIGNIFICANT CHANGE UP (ref 2.5–7)
WBC # BLD: 11 K/UL — HIGH (ref 3.8–10.5)
WBC # FLD AUTO: 11 K/UL — HIGH (ref 3.8–10.5)

## 2022-02-01 RX ADMIN — HEPARIN SODIUM 5000 UNIT(S): 5000 INJECTION INTRAVENOUS; SUBCUTANEOUS at 22:10

## 2022-02-01 RX ADMIN — Medication 90 MILLIGRAM(S): at 10:27

## 2022-02-01 RX ADMIN — HEPARIN SODIUM 5000 UNIT(S): 5000 INJECTION INTRAVENOUS; SUBCUTANEOUS at 10:27

## 2022-02-01 RX ADMIN — Medication 6 MILLIGRAM(S): at 22:08

## 2022-02-01 RX ADMIN — Medication 1 TABLET(S): at 10:27

## 2022-02-01 NOTE — PROGRESS NOTE ADULT - SUBJECTIVE AND OBJECTIVE BOX
MFM follow up note:  PAtient seen and examined and residents note reviewed.  PAtient had non severe elevation of blood pressure earlier this morning but is stable now with no complains  Perceives fetal movements and still does not want monitoring  Will scan her tomorrow and repeat EFW.  Will continue  monitoring and follow up

## 2022-02-01 NOTE — PROGRESS NOTE ADULT - ASSESSMENT
27 yo  at 24w6d admitted with severe range blood pressures and proteinuria meeting criteria for sPEC.  Additionally, fetus found to be of a non-viable weight with AEDV on UAD overall indicating poor prognosis. Patient counseled on diagnosis of sPEC and options at this age including, proceeding with pregnancy with understanding of risks of severe prematurity or interruption of pregnancy.  Patient is electing to continue to the pregnancy after adequate counseling.     #sPEC  - s/p magnesium x24 hours for seizure ppx  - HELLP labs prn  - initial abnormal lab value of P/C= 1.3, all other labs wnl.   - c/w Procardia 90 XL for stabilization of BPs    #Fetal well being  - daily FH check  - Next Growth Scan  - will revisit patient's desires for fetal monitoring and delivery at that time.   - Will defer BMZ at this time unless clinical change  - ATU (): variable, posterior, AEDV, MCA wnl  - s/p NICU consultation , patient expressed understanding of the risks of severe prematurity  - GBS pos ()  - MOD to be determined based on clinical situation at that time.    #Maternal well being  - regular diet  - HSQ, SCDs for DVT ppx    Mason Messer PGY3

## 2022-02-01 NOTE — PROGRESS NOTE ADULT - SUBJECTIVE AND OBJECTIVE BOX
R3 Antepartum Note, HD#14    Patient seen and examined at bedside, no acute overnight events. No acute complaints. Pt reports +FM, denies LOF, VB, ctx, HA, epigastric pain, blurred vision, CP, SOB, N/V, fevers, and chills.    Vital Signs Last 24 Hours  T(C): 37.8 (02-01-22 @ 05:25), Max: 37.8 (02-01-22 @ 05:25)  HR: 101 (02-01-22 @ 05:25) (92 - 118)  BP: 129/87 (02-01-22 @ 05:25) (119/79 - 151/95)  RR: 18 (02-01-22 @ 05:25) (12 - 18)  SpO2: 100% (02-01-22 @ 05:25) (81% - 100%)    Physical Exam:  General: NAD  Abdomen: Soft, non-tender, gravid  Ext: No pain or swelling    Labs:             14.2   10.05 )-----------( 244      ( 01-30 @ 08:30 )             42.8     01-30 @ 08:30    135  |  102  |  7   ----------------------------<  97  4.0   |  20  |  0.64    Ca    9.2      01-30 @ 08:30    TPro  6.5  /  Alb  3.6  /  TBili  0.2  /  DBili  x   /  AST  19  /  ALT  22  /  AlkPhos  108  01-30 @ 08:30    PT/INR - ( 01-30 @ 08:30 )   PT: 11.7 sec;   INR: 1.02 ratio    PTT - ( 01-30 @ 08:30 )  PTT:34.3 sec    Uric Acid: (01-30 @ 08:30)  4.4      Fibrinogen: (01-30 @ 08:30)  648      LDH: (01-30 @ 08:30)  166        MEDICATIONS  (STANDING):  heparin   Injectable 5000 Unit(s) SubCutaneous every 12 hours  melatonin 6 milliGRAM(s) Oral at bedtime  NIFEdipine XL 90 milliGRAM(s) Oral daily  prenatal multivitamin 1 Tablet(s) Oral daily

## 2022-02-02 ENCOUNTER — TRANSCRIPTION ENCOUNTER (OUTPATIENT)
Age: 27
End: 2022-02-02

## 2022-02-02 ENCOUNTER — APPOINTMENT (OUTPATIENT)
Dept: ANTEPARTUM | Facility: CLINIC | Age: 27
End: 2022-02-02
Payer: MEDICAID

## 2022-02-02 ENCOUNTER — OUTPATIENT (OUTPATIENT)
Dept: OUTPATIENT SERVICES | Facility: HOSPITAL | Age: 27
LOS: 1 days | End: 2022-02-02

## 2022-02-02 ENCOUNTER — ASOB RESULT (OUTPATIENT)
Age: 27
End: 2022-02-02

## 2022-02-02 PROCEDURE — 76820 UMBILICAL ARTERY ECHO: CPT | Mod: 26

## 2022-02-02 PROCEDURE — 76821 MIDDLE CEREBRAL ARTERY ECHO: CPT | Mod: 26

## 2022-02-02 PROCEDURE — 76816 OB US FOLLOW-UP PER FETUS: CPT | Mod: 26

## 2022-02-02 RX ADMIN — HEPARIN SODIUM 5000 UNIT(S): 5000 INJECTION INTRAVENOUS; SUBCUTANEOUS at 09:58

## 2022-02-02 RX ADMIN — Medication 90 MILLIGRAM(S): at 09:58

## 2022-02-02 RX ADMIN — Medication 1 TABLET(S): at 09:58

## 2022-02-02 RX ADMIN — Medication 12 MILLIGRAM(S): at 12:28

## 2022-02-02 RX ADMIN — Medication 6 MILLIGRAM(S): at 21:41

## 2022-02-02 NOTE — CONSULT NOTE PEDS - SUBJECTIVE AND OBJECTIVE BOX
I met with Ms. Amador on the antepartum unit and discussed what to expect should she were to deliver at 25 and 0 weeks gestation, with a new EFW of 389g.     1. The NICU team is going to be present at the time of delivery, and the infant will be placed under the warmer and immediately evaluated.    2. Due to severe prematurity the infant will need respiratory support, either in the form of CPAP or intubation with mechanical ventilation. If the infant requires intubation, surfactant will be administered immediately at delivery or soon after. Due to prematurity, the infant may develop lung disease during the course of the NICU admission, referred to as bronchopulmonary dysplasia.    3. Initially, feedings will not be started in the infant due to the severe prematurity. The infant will require placement of an orogastric tube to provide enteral feedings, and feeding volume will be advanced slowly as tolerated. If the infant shows signs and symptoms of feeding intolerance, feedings may be held, and the infant may require evaluation for necrotizing enterocolitis.    4. The importance of human milk as the exclusive source of nutrition for  infants was discussed.    5. In order to have IV access lines would be placed in the umbilicus and later long term lines such as PICC lines would be needed to receive IV fluids/nutrition and medications.    6. Due to the severe prematurity, the infant will be started on antibiotics and screened for infections once born. The infant may require other courses of antibiotics during their hospital course if an infection is suspected.    7. The infant will be monitored for hypotension and may require vasopressor medication. The infant may also require screening for a patent ductus arteriosus, and if clinically significant, may require medical or surgical treatment.    8. The infant is at risk for jaundice and will likely require phototherapy.    9. The infant will develop anemia of prematurity, and will likely require blood transfusions.    10. The infant is at increased risk for intraventricular hemorrhage, which may result in severe developmental delays. Even in the absence of IVH the infant is at risk for developmental delays as a consequence of prematurity. The infant will be followed by a developmental pediatrician after discharge from the NICU to monitor for neurodevelopmental delays.    11. The infant is at risk for retinopathy of prematurity. Severe ROP can lead to diminished visual acuity or blindness. The infant will be examined regularly by a pediatric ophthalmologist, and if ROP is severe may require medication or eye surgery.    Ms. Amador had the chance to ask any questions she may have had, and was encouraged to contact the NICU at that time if additional questions arise.    Thank you for the opportunity to participate in the care of this patient and please inform us of any changes in her status.    Sandra Ying MD  NICU Fellow  I met with Ms. Amador on the antepartum unit and discussed what to expect should she were to deliver at 25 and 0 weeks gestation, with a new EFW of 389g. Her  was present for the discussion. This is a planned scheduled CS for 10am on 2/3, tomorrow.    I had a lengthy discussion about what to expect in the delivery room, resuscitation, short and long term goals as well as prognostication at this point. I explained the difficulty in providing information on outcomes prior to delivery; however at this point, based on the extreme growth restriction of the baby with AEDV (), the baby is at high risk of demise either immediately after delivery or later on in the NICU course. I discussed that we will do everything we can to resuscitate and care for the baby with the understanding that the size of the infant may limit our ability to provide ventilation, in which case we will redirect care at that point. I also explained that if he survives and is brought to the NICU, there is always the opportunity to compassionately redirect care postnatally, should his outlook appear grim with comprehensive discussion with the parents and NICU team together. I advised that although the baby is growth restricted,  weight can differ by up to 20% and we may be able to successfully care for the baby as well. Both parents understand the high risk nature of the delivery and their goals include giving him a fighting chance, with the understanding that if we are faced with challenges including physical limitations of baby's size, they are very willing to hold him and provide comfort care at that time.    1. The NICU team is going to be present at the time of delivery, and the infant will be placed under the warmer and immediately evaluated.    2. Due to severe prematurity the infant will need respiratory support, either in the form of CPAP or intubation with mechanical ventilation. If the infant requires intubation, surfactant will be administered immediately at delivery or soon after. Due to prematurity, the infant may develop lung disease during the course of the NICU admission, referred to as bronchopulmonary dysplasia.    3. Initially, feedings will not be started in the infant due to the severe prematurity. The infant will require placement of an orogastric tube to provide enteral feedings, and feeding volume will be advanced slowly as tolerated. If the infant shows signs and symptoms of feeding intolerance, feedings may be held, and the infant may require evaluation for necrotizing enterocolitis.    4. The importance of human milk as the exclusive source of nutrition for  infants was discussed.    5. In order to have IV access lines would be placed in the umbilicus and later long term lines such as PICC lines would be needed to receive IV fluids/nutrition and medications.    6. Due to the severe prematurity, the infant will be started on antibiotics and screened for infections once born. The infant may require other courses of antibiotics during their hospital course if an infection is suspected.    7. The infant will be monitored for hypotension and may require vasopressor medication. The infant may also require screening for a patent ductus arteriosus, and if clinically significant, may require medical or surgical treatment.    8. The infant is at risk for jaundice and will likely require phototherapy.    9. The infant will develop anemia of prematurity, and will likely require blood transfusions.    10. The infant is at increased risk for intraventricular hemorrhage, which may result in severe developmental delays. Even in the absence of IVH the infant is at risk for developmental delays as a consequence of prematurity. The infant will be followed by a developmental pediatrician after discharge from the NICU to monitor for neurodevelopmental delays.    11. The infant is at risk for retinopathy of prematurity. Severe ROP can lead to diminished visual acuity or blindness. The infant will be examined regularly by a pediatric ophthalmologist, and if ROP is severe may require medication or eye surgery.    Ms. Amador had the chance to ask any questions she may have had, and was encouraged to contact the NICU at that time if additional questions arise.    Thank you for the opportunity to participate in the care of this patient and please inform us of any changes in her status.    Sandra Ying MD  NICU Fellow  I met with Ms. Amador on the antepartum unit and discussed what to expect should she were to deliver at 25 and 0 weeks gestation, with a new EFW of 389g. Her  was present for the discussion. This is a planned scheduled CS for 10am on 2/3, tomorrow. Beta and Mg to be given prior to delivery.    I had a lengthy discussion about what to expect in the delivery room, resuscitation, short and long term goals as well as prognostication at this point. I explained the difficulty in providing information on outcomes prior to delivery; however at this point, based on the extreme growth restriction of the baby with AEDV (), the baby is at high risk of demise either immediately after delivery or later on in the NICU course. I discussed that we will do everything we can to resuscitate and care for the baby with the understanding that the size of the infant may limit our ability to provide ventilation, in which case we will redirect care at that point. I also explained that if he survives and is brought to the NICU, there is always the opportunity to compassionately redirect care postnatally, should his outlook appear grim with comprehensive discussion with the parents and NICU team together. I advised that although the baby is growth restricted,  weight can differ by up to 20% and we may be able to successfully care for the baby as well. Both parents understand the high risk nature of the delivery and their goals include giving him a fighting chance, with the understanding that if we are faced with challenges including physical limitations of baby's size, they are very willing to hold him and provide comfort care at that time.    1. The NICU team is going to be present at the time of delivery, and the infant will be placed under the warmer and immediately evaluated.    2. Due to severe prematurity the infant will need respiratory support, either in the form of CPAP or intubation with mechanical ventilation. If the infant requires intubation, surfactant will be administered immediately at delivery or soon after. Due to prematurity, the infant may develop lung disease during the course of the NICU admission, referred to as bronchopulmonary dysplasia.    3. Initially, feedings will not be started in the infant due to the severe prematurity. The infant will require placement of an orogastric tube to provide enteral feedings, and feeding volume will be advanced slowly as tolerated. If the infant shows signs and symptoms of feeding intolerance, feedings may be held, and the infant may require evaluation for necrotizing enterocolitis.    4. The importance of human milk as the exclusive source of nutrition for  infants was discussed.    5. In order to have IV access lines would be placed in the umbilicus and later long term lines such as PICC lines would be needed to receive IV fluids/nutrition and medications.    6. Due to the severe prematurity, the infant will be started on antibiotics and screened for infections once born. The infant may require other courses of antibiotics during their hospital course if an infection is suspected.    7. The infant will be monitored for hypotension and may require vasopressor medication. The infant may also require screening for a patent ductus arteriosus, and if clinically significant, may require medical or surgical treatment.    8. The infant is at risk for jaundice and will likely require phototherapy.    9. The infant will develop anemia of prematurity, and will likely require blood transfusions.    10. The infant is at increased risk for intraventricular hemorrhage, which may result in severe developmental delays. Even in the absence of IVH the infant is at risk for developmental delays as a consequence of prematurity. The infant will be followed by a developmental pediatrician after discharge from the NICU to monitor for neurodevelopmental delays.    11. The infant is at risk for retinopathy of prematurity. Severe ROP can lead to diminished visual acuity or blindness. The infant will be examined regularly by a pediatric ophthalmologist, and if ROP is severe may require medication or eye surgery.    Ms. Amador had the chance to ask any questions she may have had, and was encouraged to contact the NICU at that time if additional questions arise.    Thank you for the opportunity to participate in the care of this patient and please inform us of any changes in her status.    Sandra Ying MD  NICU Fellow

## 2022-02-02 NOTE — PROGRESS NOTE ADULT - SUBJECTIVE AND OBJECTIVE BOX
Patient is a 26y old  Female who presents with a chief complaint of Preeclampsia with Severe Features (2022 05:41)      HPI:  26 y.o.  at 23w presenting from ED with complaints of blood pressure of 150/120. Patient reports diagnosis of gestational hypertension yesterday in OB office and was started on Procardia 30XL, last dose yesterday at 1600.  Patient reports headache since  yesterday, has not taken any meds for relief.  Patient reports visual disturbances, and nausea.  Patient denies RUQ and vomiting  Patient denies any OB complaints, reports positive fetal movement, denies LOF, denies vaginal bleeding, denies contractions     a/p course complicated by Gestational Hypertension  Prenatal Care from [facility]     pmh: COVID 2021  psh: denies   obhx: denies   gynhx: PCO     Meds: Nifidipine 30XL   Metformin 500mg daily   Aspirin 81mg  PNV  (2022 09:31)      PAST MEDICAL & SURGICAL HISTORY:  Migraine    History of PCOS        MEDICATIONS  (STANDING):  heparin   Injectable 5000 Unit(s) SubCutaneous every 12 hours  melatonin 6 milliGRAM(s) Oral at bedtime  NIFEdipine XL 90 milliGRAM(s) Oral daily  prenatal multivitamin 1 Tablet(s) Oral daily              Vital Signs Last 24 Hrs  T(C): 36.6 (2022 09:30), Max: 36.7 (2022 17:50)  T(F): 97.9 (2022 09:30), Max: 98.1 (2022 01:05)  HR: 102 (2022 09:30) (87 - 981)  BP: 132/81 (2022 09:30) (122/72 - 137/81)  BP(mean): --  RR: 16 (2022 09:30) (12 - 18)  SpO2: 100% (2022 09:30) (99% - 100%)    PHYSICAL EXAM:  PAtient seen and evaluated and bedside ultrasound done. EFW was 389 gram with REDV, Nl BPP and fluid noted.  Patient was explained that there had been lack of growth and worsening doppler studies  We discussed options for management including classical  section or continued conservative management. The posr prognosis was also discussed. After giving some time for patient to think about it patient wants to be delivered by  section. Will initiate steroids and give her mag prior to delivery. Will plan delivery for tomorrow.  Notify  and anesthesia services for delivery plan.      Constitutional:    Eyes:    ENMT:    Neck:    Breasts:    Back:    Respiratory:    Cardiovascular:    Gastrointestinal:    Genitourinary:    Rectal:    Extremities:    Vascular:    Neurological:    Skin:    Lymph Nodes:    Musculoskeletal:    Psychiatric:        I&O's Summary    2022 07:01  -  2022 07:00  --------------------------------------------------------  IN: 0 mL / OUT: 300 mL / NET: -300 mL    2022 07:01  -  2022 12:29  --------------------------------------------------------  IN: 0 mL / OUT: 800 mL / NET: -800 mL        LABORATORY:                        14.4   11.00 )-----------( 267      ( 2022 07:16 )             40.8     02    136  |  101  |  7   ----------------------------<  66<L>  3.9   |  21<L>  |  0.66    Ca    9.2      2022 07:16    TPro  7.1  /  Alb  3.7  /  TBili  0.3  /  DBili  x   /  AST  19  /  ALT  19  /  AlkPhos  116  02-    PT/INR - ( 2022 07:16 )   PT: 11.5 sec;   INR: 1.00 ratio         PTT - ( 2022 07:16 )  PTT:36.2 sec

## 2022-02-02 NOTE — CONSULT NOTE PEDS - CONSULT REASON
extremely premature infant: 25 weeks gestation  poor interval growth with reversal of flow: EFW 389g  NICU consult #2 extremely premature infant: 25 weeks gestation  poor interval growth with reversal of flow: EFW 389g with worsening doppler studies  NICU consult #2 yes

## 2022-02-02 NOTE — CONSULT NOTE PEDS - ATTENDING COMMENTS
This is a prenatal consult for an extreme  infant at 25 week of gestation and with severe intra uterine growth restriction. Mother was explained in details about high risk of mortality and morbidity . She verbalize the understanding and desire full resuscitation for the baby at this time. Will follow.

## 2022-02-02 NOTE — PROGRESS NOTE ADULT - ASSESSMENT
27 yo  at 25w admitted with severe range blood pressures and proteinuria meeting criteria for sPEC.  Additionally, fetus found to be of a non-viable weight with AEDV on UAD overall indicating poor prognosis. Patient counseled on diagnosis of sPEC and options at this age including, proceeding with pregnancy with understanding of risks of severe prematurity or interruption of pregnancy.  Patient is electing to continue to the pregnancy after adequate counseling.     #sPEC  - s/p magnesium x24 hours for seizure ppx  - HELLP labs prn  - initial abnormal lab value of P/C= 1.3, all other labs wnl.   - c/w Procardia 90 XL for stabilization of BPs    #Fetal well being  - daily FH check  - Next Growth Scan  - will revisit patient's desires for fetal monitoring and delivery at that time.   - Will defer BMZ at this time unless clinical change  - ATU (): variable, posterior, AEDV, MCA wnl  - s/p NICU consultation , patient expressed understanding of the risks of severe prematurity  - GBS pos ()  - MOD to be determined based on clinical situation at that time.    #Maternal well being  - regular diet  - HSQ, SCDs for DVT ppx    Mason Messer PGY3

## 2022-02-02 NOTE — PROGRESS NOTE ADULT - SUBJECTIVE AND OBJECTIVE BOX
R3 Antepartum Note, HD#15    Patient seen and examined at bedside, no acute overnight events. No acute complaints. Pt reports +FM, denies LOF, VB, ctx, HA, epigastric pain, blurred vision, CP, SOB, N/V, fevers, and chills.    Vital Signs Last 24 Hours  T(C): 36.7 (02-02-22 @ 05:23), Max: 37.1 (02-01-22 @ 09:30)  HR: 97 (02-02-22 @ 05:23) (90 - 981)  BP: 122/72 (02-02-22 @ 05:23) (122/72 - 137/81)  RR: 18 (02-02-22 @ 05:23) (12 - 20)  SpO2: 100% (02-02-22 @ 05:23) (99% - 100%)    Physical Exam:  General: NAD  Abdomen: Soft, non-tender, gravid  Ext: No pain or swelling    Labs:             14.4   11.00 )-----------( 267      ( 02-01 @ 07:16 )             40.8     02-01 @ 07:16    136  |  101  |  7   ----------------------------<  66  3.9   |  21  |  0.66    Ca    9.2      02-01 @ 07:16    TPro  7.1  /  Alb  3.7  /  TBili  0.3  /  DBili  x   /  AST  19  /  ALT  19  /  AlkPhos  116  02-01 @ 07:16  PT/INR - ( 02-01 @ 07:16 )   PT: 11.5 sec;   INR: 1.00 ratio  PTT - ( 02-01 @ 07:16 )  PTT:36.2 sec  Uric Acid: (02-01 @ 07:16)  3.9    Fibrinogen: (02-01 @ 07:16)  614    LDH: (02-01 @ 07:16)  190        MEDICATIONS  (STANDING):  heparin   Injectable 5000 Unit(s) SubCutaneous every 12 hours  melatonin 6 milliGRAM(s) Oral at bedtime  NIFEdipine XL 90 milliGRAM(s) Oral daily  prenatal multivitamin 1 Tablet(s) Oral daily

## 2022-02-03 ENCOUNTER — APPOINTMENT (OUTPATIENT)
Dept: ANTEPARTUM | Facility: HOSPITAL | Age: 27
End: 2022-02-03
Payer: MEDICAID

## 2022-02-03 ENCOUNTER — RESULT REVIEW (OUTPATIENT)
Age: 27
End: 2022-02-03

## 2022-02-03 ENCOUNTER — ASOB RESULT (OUTPATIENT)
Age: 27
End: 2022-02-03

## 2022-02-03 ENCOUNTER — OUTPATIENT (OUTPATIENT)
Dept: OUTPATIENT SERVICES | Facility: HOSPITAL | Age: 27
LOS: 1 days | End: 2022-02-03

## 2022-02-03 LAB
ALBUMIN SERPL ELPH-MCNC: 3.7 G/DL — SIGNIFICANT CHANGE UP (ref 3.3–5)
ALP SERPL-CCNC: 117 U/L — SIGNIFICANT CHANGE UP (ref 40–120)
ALT FLD-CCNC: 20 U/L — SIGNIFICANT CHANGE UP (ref 4–33)
ANION GAP SERPL CALC-SCNC: 15 MMOL/L — HIGH (ref 7–14)
APTT BLD: 30.6 SEC — SIGNIFICANT CHANGE UP (ref 27–36.3)
AST SERPL-CCNC: 21 U/L — SIGNIFICANT CHANGE UP (ref 4–32)
BASOPHILS # BLD AUTO: 0.03 K/UL — SIGNIFICANT CHANGE UP (ref 0–0.2)
BASOPHILS NFR BLD AUTO: 0.2 % — SIGNIFICANT CHANGE UP (ref 0–2)
BILIRUB SERPL-MCNC: <0.2 MG/DL — SIGNIFICANT CHANGE UP (ref 0.2–1.2)
BLD GP AB SCN SERPL QL: NEGATIVE — SIGNIFICANT CHANGE UP
BUN SERPL-MCNC: 8 MG/DL — SIGNIFICANT CHANGE UP (ref 7–23)
CALCIUM SERPL-MCNC: 9 MG/DL — SIGNIFICANT CHANGE UP (ref 8.4–10.5)
CHLORIDE SERPL-SCNC: 99 MMOL/L — SIGNIFICANT CHANGE UP (ref 98–107)
CMV IGM FLD-ACNC: <8 AU/ML — SIGNIFICANT CHANGE UP
CMV IGM SERPL QL: NEGATIVE — SIGNIFICANT CHANGE UP
CO2 SERPL-SCNC: 17 MMOL/L — LOW (ref 22–31)
CREAT SERPL-MCNC: 0.55 MG/DL — SIGNIFICANT CHANGE UP (ref 0.5–1.3)
EOSINOPHIL # BLD AUTO: 0 K/UL — SIGNIFICANT CHANGE UP (ref 0–0.5)
EOSINOPHIL NFR BLD AUTO: 0 % — SIGNIFICANT CHANGE UP (ref 0–6)
FIBRINOGEN PPP-MCNC: 598 MG/DL — HIGH (ref 290–520)
GLUCOSE SERPL-MCNC: 183 MG/DL — HIGH (ref 70–99)
HCT VFR BLD CALC: 40.4 % — SIGNIFICANT CHANGE UP (ref 34.5–45)
HGB BLD-MCNC: 13.7 G/DL — SIGNIFICANT CHANGE UP (ref 11.5–15.5)
HSV1 IGG SER-ACNC: 24.2 INDEX — HIGH
HSV1 IGG SERPL QL IA: POSITIVE
HSV2 IGG FLD-ACNC: 0.09 INDEX — SIGNIFICANT CHANGE UP
HSV2 IGG SERPL QL IA: NEGATIVE — SIGNIFICANT CHANGE UP
IANC: 10.43 K/UL — HIGH (ref 1.5–8.5)
IMM GRANULOCYTES NFR BLD AUTO: 0.5 % — SIGNIFICANT CHANGE UP (ref 0–1.5)
INR BLD: 1.05 RATIO — SIGNIFICANT CHANGE UP (ref 0.88–1.16)
LDH SERPL L TO P-CCNC: 196 U/L — SIGNIFICANT CHANGE UP (ref 135–225)
LUPUS ANTICOAGULANT PROFILE RESULT: SIGNIFICANT CHANGE UP
LYMPHOCYTES # BLD AUTO: 17.1 % — SIGNIFICANT CHANGE UP (ref 13–44)
LYMPHOCYTES # BLD AUTO: 2.33 K/UL — SIGNIFICANT CHANGE UP (ref 1–3.3)
MAGNESIUM SERPL-MCNC: 6.5 MG/DL — HIGH (ref 1.6–2.6)
MCHC RBC-ENTMCNC: 29.1 PG — SIGNIFICANT CHANGE UP (ref 27–34)
MCHC RBC-ENTMCNC: 33.9 GM/DL — SIGNIFICANT CHANGE UP (ref 32–36)
MCV RBC AUTO: 85.8 FL — SIGNIFICANT CHANGE UP (ref 80–100)
MONOCYTES # BLD AUTO: 0.76 K/UL — SIGNIFICANT CHANGE UP (ref 0–0.9)
MONOCYTES NFR BLD AUTO: 5.6 % — SIGNIFICANT CHANGE UP (ref 2–14)
NEUTROPHILS # BLD AUTO: 10.43 K/UL — HIGH (ref 1.8–7.4)
NEUTROPHILS NFR BLD AUTO: 76.6 % — SIGNIFICANT CHANGE UP (ref 43–77)
NRBC # BLD: 0 /100 WBCS — SIGNIFICANT CHANGE UP
NRBC # FLD: 0 K/UL — SIGNIFICANT CHANGE UP
PLATELET # BLD AUTO: 280 K/UL — SIGNIFICANT CHANGE UP (ref 150–400)
POTASSIUM SERPL-MCNC: 4 MMOL/L — SIGNIFICANT CHANGE UP (ref 3.5–5.3)
POTASSIUM SERPL-SCNC: 4 MMOL/L — SIGNIFICANT CHANGE UP (ref 3.5–5.3)
PROT SERPL-MCNC: 7.2 G/DL — SIGNIFICANT CHANGE UP (ref 6–8.3)
PROTHROM AB SERPL-ACNC: 12.1 SEC — SIGNIFICANT CHANGE UP (ref 10.6–13.6)
RBC # BLD: 4.71 M/UL — SIGNIFICANT CHANGE UP (ref 3.8–5.2)
RBC # FLD: 12.1 % — SIGNIFICANT CHANGE UP (ref 10.3–14.5)
RH IG SCN BLD-IMP: POSITIVE — SIGNIFICANT CHANGE UP
RUBV IGG SER-ACNC: 1.7 INDEX — SIGNIFICANT CHANGE UP
RUBV IGG SER-IMP: POSITIVE — SIGNIFICANT CHANGE UP
SODIUM SERPL-SCNC: 131 MMOL/L — LOW (ref 135–145)
T GONDII IGG SER QL: <3 IU/ML — SIGNIFICANT CHANGE UP
T GONDII IGG SER QL: NEGATIVE — SIGNIFICANT CHANGE UP
T GONDII IGM SER QL: 6.2 AU/ML — SIGNIFICANT CHANGE UP
T GONDII IGM SER QL: NEGATIVE — SIGNIFICANT CHANGE UP
T PALLIDUM AB TITR SER: NEGATIVE — SIGNIFICANT CHANGE UP
URATE SERPL-MCNC: 3.7 MG/DL — SIGNIFICANT CHANGE UP (ref 2.5–7)
WBC # BLD: 13.62 K/UL — HIGH (ref 3.8–10.5)
WBC # FLD AUTO: 13.62 K/UL — HIGH (ref 3.8–10.5)

## 2022-02-03 PROCEDURE — 59515 CESAREAN DELIVERY: CPT | Mod: U7

## 2022-02-03 PROCEDURE — 76819 FETAL BIOPHYS PROFIL W/O NST: CPT | Mod: 26

## 2022-02-03 PROCEDURE — 88307 TISSUE EXAM BY PATHOLOGIST: CPT | Mod: 26

## 2022-02-03 PROCEDURE — 76828 ECHO EXAM OF FETAL HEART: CPT | Mod: 26

## 2022-02-03 PROCEDURE — 76821 MIDDLE CEREBRAL ARTERY ECHO: CPT | Mod: 26

## 2022-02-03 PROCEDURE — 76820 UMBILICAL ARTERY ECHO: CPT | Mod: 26

## 2022-02-03 DEVICE — SURGICEL FIBRILLAR 1 X 2": Type: IMPLANTABLE DEVICE | Status: FUNCTIONAL

## 2022-02-03 RX ORDER — MAGNESIUM HYDROXIDE 400 MG/1
30 TABLET, CHEWABLE ORAL
Refills: 0 | Status: DISCONTINUED | OUTPATIENT
Start: 2022-02-03 | End: 2022-02-07

## 2022-02-03 RX ORDER — LANOLIN
1 OINTMENT (GRAM) TOPICAL EVERY 6 HOURS
Refills: 0 | Status: DISCONTINUED | OUTPATIENT
Start: 2022-02-03 | End: 2022-02-07

## 2022-02-03 RX ORDER — OXYCODONE HYDROCHLORIDE 5 MG/1
5 TABLET ORAL
Refills: 0 | Status: DISCONTINUED | OUTPATIENT
Start: 2022-02-03 | End: 2022-02-04

## 2022-02-03 RX ORDER — MAGNESIUM SULFATE 500 MG/ML
4 VIAL (ML) INJECTION ONCE
Refills: 0 | Status: COMPLETED | OUTPATIENT
Start: 2022-02-03 | End: 2022-02-03

## 2022-02-03 RX ORDER — SODIUM CHLORIDE 9 MG/ML
1000 INJECTION, SOLUTION INTRAVENOUS
Refills: 0 | Status: DISCONTINUED | OUTPATIENT
Start: 2022-02-03 | End: 2022-02-04

## 2022-02-03 RX ORDER — KETOROLAC TROMETHAMINE 30 MG/ML
30 SYRINGE (ML) INJECTION EVERY 6 HOURS
Refills: 0 | Status: DISCONTINUED | OUTPATIENT
Start: 2022-02-03 | End: 2022-02-04

## 2022-02-03 RX ORDER — DEXAMETHASONE 0.5 MG/5ML
4 ELIXIR ORAL EVERY 6 HOURS
Refills: 0 | Status: DISCONTINUED | OUTPATIENT
Start: 2022-02-03 | End: 2022-02-07

## 2022-02-03 RX ORDER — SIMETHICONE 80 MG/1
80 TABLET, CHEWABLE ORAL EVERY 4 HOURS
Refills: 0 | Status: DISCONTINUED | OUTPATIENT
Start: 2022-02-03 | End: 2022-02-07

## 2022-02-03 RX ORDER — TETANUS TOXOID, REDUCED DIPHTHERIA TOXOID AND ACELLULAR PERTUSSIS VACCINE, ADSORBED 5; 2.5; 8; 8; 2.5 [IU]/.5ML; [IU]/.5ML; UG/.5ML; UG/.5ML; UG/.5ML
0.5 SUSPENSION INTRAMUSCULAR ONCE
Refills: 0 | Status: COMPLETED | OUTPATIENT
Start: 2022-02-03

## 2022-02-03 RX ORDER — HEPARIN SODIUM 5000 [USP'U]/ML
5000 INJECTION INTRAVENOUS; SUBCUTANEOUS EVERY 12 HOURS
Refills: 0 | Status: DISCONTINUED | OUTPATIENT
Start: 2022-02-03 | End: 2022-02-07

## 2022-02-03 RX ORDER — MAGNESIUM SULFATE 500 MG/ML
2 VIAL (ML) INJECTION
Qty: 40 | Refills: 0 | Status: DISCONTINUED | OUTPATIENT
Start: 2022-02-03 | End: 2022-02-03

## 2022-02-03 RX ORDER — CITRIC ACID/SODIUM CITRATE 300-500 MG
30 SOLUTION, ORAL ORAL ONCE
Refills: 0 | Status: COMPLETED | OUTPATIENT
Start: 2022-02-03 | End: 2022-02-03

## 2022-02-03 RX ORDER — OXYCODONE HYDROCHLORIDE 5 MG/1
10 TABLET ORAL
Refills: 0 | Status: DISCONTINUED | OUTPATIENT
Start: 2022-02-03 | End: 2022-02-03

## 2022-02-03 RX ORDER — OXYCODONE HYDROCHLORIDE 5 MG/1
5 TABLET ORAL ONCE
Refills: 0 | Status: DISCONTINUED | OUTPATIENT
Start: 2022-02-05 | End: 2022-02-07

## 2022-02-03 RX ORDER — MAGNESIUM SULFATE 500 MG/ML
2 VIAL (ML) INJECTION
Qty: 40 | Refills: 0 | Status: DISCONTINUED | OUTPATIENT
Start: 2022-02-03 | End: 2022-02-04

## 2022-02-03 RX ORDER — OXYTOCIN 10 UNIT/ML
16.67 VIAL (ML) INJECTION
Qty: 20 | Refills: 0 | Status: DISCONTINUED | OUTPATIENT
Start: 2022-02-03 | End: 2022-02-07

## 2022-02-03 RX ORDER — MAGNESIUM SULFATE 500 MG/ML
4 VIAL (ML) INJECTION ONCE
Refills: 0 | Status: DISCONTINUED | OUTPATIENT
Start: 2022-02-03 | End: 2022-02-04

## 2022-02-03 RX ORDER — NALBUPHINE HYDROCHLORIDE 10 MG/ML
2.5 INJECTION, SOLUTION INTRAMUSCULAR; INTRAVENOUS; SUBCUTANEOUS EVERY 6 HOURS
Refills: 0 | Status: DISCONTINUED | OUTPATIENT
Start: 2022-02-03 | End: 2022-02-07

## 2022-02-03 RX ORDER — ONDANSETRON 8 MG/1
4 TABLET, FILM COATED ORAL EVERY 6 HOURS
Refills: 0 | Status: DISCONTINUED | OUTPATIENT
Start: 2022-02-03 | End: 2022-02-07

## 2022-02-03 RX ORDER — FAMOTIDINE 10 MG/ML
20 INJECTION INTRAVENOUS ONCE
Refills: 0 | Status: COMPLETED | OUTPATIENT
Start: 2022-02-03 | End: 2022-02-03

## 2022-02-03 RX ORDER — OXYCODONE HYDROCHLORIDE 5 MG/1
5 TABLET ORAL
Refills: 0 | Status: DISCONTINUED | OUTPATIENT
Start: 2022-02-03 | End: 2022-02-07

## 2022-02-03 RX ORDER — ACETAMINOPHEN 500 MG
975 TABLET ORAL
Refills: 0 | Status: DISCONTINUED | OUTPATIENT
Start: 2022-02-03 | End: 2022-02-07

## 2022-02-03 RX ORDER — DIPHENHYDRAMINE HCL 50 MG
25 CAPSULE ORAL EVERY 6 HOURS
Refills: 0 | Status: DISCONTINUED | OUTPATIENT
Start: 2022-02-03 | End: 2022-02-07

## 2022-02-03 RX ORDER — IBUPROFEN 200 MG
600 TABLET ORAL EVERY 6 HOURS
Refills: 0 | Status: COMPLETED | OUTPATIENT
Start: 2022-02-03 | End: 2023-01-02

## 2022-02-03 RX ORDER — NALOXONE HYDROCHLORIDE 4 MG/.1ML
0.1 SPRAY NASAL
Refills: 0 | Status: DISCONTINUED | OUTPATIENT
Start: 2022-02-03 | End: 2022-02-07

## 2022-02-03 RX ADMIN — Medication 6 MILLIGRAM(S): at 22:05

## 2022-02-03 RX ADMIN — Medication 50 GM/HR: at 19:20

## 2022-02-03 RX ADMIN — Medication 300 GRAM(S): at 10:33

## 2022-02-03 RX ADMIN — HEPARIN SODIUM 5000 UNIT(S): 5000 INJECTION INTRAVENOUS; SUBCUTANEOUS at 20:15

## 2022-02-03 RX ADMIN — Medication 50 GM/HR: at 18:25

## 2022-02-03 RX ADMIN — Medication 975 MILLIGRAM(S): at 22:55

## 2022-02-03 RX ADMIN — Medication 975 MILLIGRAM(S): at 22:05

## 2022-02-03 RX ADMIN — SODIUM CHLORIDE 50 MILLILITER(S): 9 INJECTION, SOLUTION INTRAVENOUS at 19:32

## 2022-02-03 RX ADMIN — Medication 50 GM/HR: at 15:08

## 2022-02-03 RX ADMIN — Medication 30 MILLILITER(S): at 10:40

## 2022-02-03 RX ADMIN — Medication 90 MILLIGRAM(S): at 07:54

## 2022-02-03 RX ADMIN — Medication 12 MILLIGRAM(S): at 07:50

## 2022-02-03 RX ADMIN — FAMOTIDINE 20 MILLIGRAM(S): 10 INJECTION INTRAVENOUS at 10:41

## 2022-02-03 NOTE — OB RN DELIVERY SUMMARY - NSSELHIDDEN_OBGYN_ALL_OB_FT
[NS_DeliveryAttending1_OBGYN_ALL_OB_FT:[id number]==],[NS_DeliveryRN_OBGYN_ALL_OB_FT:[id number]]

## 2022-02-03 NOTE — OB PROVIDER DELIVERY SUMMARY - NSATTEMPTEDVBAC_OBGYN_ALL_OB
Patient with Hypercapnic and Hypoxic Respiratory failure which is Acute.  she is not on home oxygen.  Patient remains on nasal cannula presently.    Differential diagnosis includes - COPD, Obesity Hypoventilation, Pneumonia and Aspiration Labs and images were reviewed. Patient Has not has a recent ABG.     Will treat underlying causes and adjust management of respiratory failure as follows- continue to wean respiratory support as tolerated.  Patient is doing well post extubation.         No

## 2022-02-03 NOTE — OB RN DELIVERY SUMMARY - NS_SEPSISRSKCALC_OBGYN_ALL_OB_FT
No temperature has been documented for this patient in CPN or on the OB Flowsheet. Ensure the highest temperature during labor was documented on the OB Flowsheet.  Unable to calculate the EOS score due to gestational age being less than 34 weeks or more than 43 weeks.

## 2022-02-03 NOTE — PROGRESS NOTE ADULT - ASSESSMENT
25 yo  at 25w1d admitted with severe range blood pressures and proteinuria meeting criteria for sPEC.  Additionally, fetus found to be of a non-viable weight with AEDV on UAD overall indicating poor prognosis. Patient counseled on diagnosis of sPEC and options at this age including, proceeding with pregnancy with understanding of risks of severe prematurity or interruption of pregnancy.  2/ Rpt growth performed which was minimal and now with RDFV. Plan for delivery today    #sPEC  - s/p magnesium x24 hours for seizure ppx  - HELLP labs prn  - initial abnormal lab value of P/C= 1.3, all other labs wnl.   - c/w Procardia 90 XL for stabilization of BPs    #Fetal well being  - BMZ (-2/3)  - ATU (): 386g, RDFV  - ATU (): variable, posterior, AEDV, MCA wnl  - s/p NICU consultation  and , patient expressed understanding of the risks of severe prematurity  - GBS pos ()  - Delivery via C/S today    #Maternal well being  - regular diet  - HSQ, SCDs for DVT ppx    Mason Messer PGY3

## 2022-02-03 NOTE — OB RN INTRAOPERATIVE NOTE - NS_ELECTROPADLOC_OBGYN_ALL_OB
OFFICE VISIT      Patient: Mariel Diaz Date of Service: 19   : 1953 MRN: [id number]     History of Present Illness     HPI    Mariel Diaz is a 65 year old female who presents today for   Chief Complaint   Patient presents with   • Other      visit     Here for     Currently reading Jacy [employer] - enjoying reading more, going to [employer]   Really liked [employer] [facility] [facility]    Health Maintenance:    Colon Cancer Screening:  Family H/O Colon CA: No family history of colon cancer.  Last Colonoscopy: > 10 years ago  GI Symptoms: No  NO reports available    Breast Cancer Screening:  FHX: She reports no family history of breast cancer.  PMH: denies  Mammogram: up to date  Previous mammogram:  Normal  Needs Screening Breast US for dense breast    Cervical Cancer Screening:  Pt with history of normal Pap tests  S/p hysterectomy 2/2 endometriosis in     Osteoporosis screening: indicated  postmenopausal and s/p surgical menopause  Last DEXA scan: due   No prior h/o medications    Lung Cancer Screening (age 55-80): not indicated  Smoking h/o: denies smoking    Hepatitis C Screening:  indicated  Lab Results   Component Value Date    HCV NEGATIVE 2016       Lifestyle   Diet: decent   BMI: Body mass index is 27.18 kg/m².  Exercise: no limited 2/2 knee pain  Sleep: not sleeping well   Dental: sees a dentist ocassionally  Work/Occupation/Schooling: not working currently  OB/GYN h/o: G0  Contraception: none  Relationship: single   Concerns: denies    Social History:  Social History     Tobacco Use   • Smoking status: Former Smoker     Last attempt to quit: 3/26/1999     Years since quittin.1   • Smokeless tobacco: Never Used   Substance Use Topics   • Alcohol use: Yes     Comment: social    • Drug use: No       Immunization:  FLU: not flu season  Td/Tdap: check with insurance  HPV vaccine: Not Needed  Pneumovax: Needed  Prevnar: Needed  Shingrix: Needed    Mariel mckeon  include:  Patient Active Problem List   Diagnosis   • Anxiety   • Essential hypertension   • Candidal intertrigo   • Cough   • Depression   • Dizziness   • HLD (hyperlipidemia)   • Insomnia   • Mitral valve prolapse   • Thoracic radiculopathy   • Vitamin D deficiency   • Congenital anomaly of ribs and sternum   • Acute non-recurrent frontal sinusitis   • Routine health maintenance       Patient's barriers to care plan: No    Compliance to treatment plan: Yes    Medication adherence:  Patient reports adherence to dosing schedules Yes  Side effects of medication: No   Is patient taking Aspirin? No    Advanced care planning: Discussed with patient. Patient has completed forms at home, will bring at next visit    Chronic Care:    HTN:   Taking medicine regularly: yes  Side effects:  no and no lightheadedness since self decreased dose of combo pill  Blood pressure is well controlled at home: no concerns  Cardiac symptoms: none.   Patient denies chest pain, chest pressure, claudication, dyspnea, exertional chest pressure/discomfort, fatigue, irregular heart beat, lower extremity edema -  Bilateral, near-syncope, orthopnea, palpitations, paroxysmal nocturnal dyspnea, syncope, tachypnea.   Cardiovascular risk factors: none  Use of agents associated with hypertension: none.   History of target organ damage: none.  Watching diet: yes, is adherent to a low-salt diet.  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 No pertinent past medical history.    MEDICATIONS:  Current Outpatient Medications   Medication Sig   • losartan-hydrochlorothiazide  (HYZAAR) 100-25 MG per tablet Take 0.5 tablets by mouth daily.   • gabapentin (NEURONTIN) 100 MG capsule Take 2 tabs at night and 1 tab during the day as needed for pain   • DULoxetine (CYMBALTA) 20 MG capsule Take 1 capsule by mouth daily.   • [START ON 5/20/2019] Vitamin D, Ergocalciferol, 89884 units capsule Take 1 capsule by mouth 1 day a week.     No current facility-administered medications for this visit.        ALLERGIES:  ALLERGIES:   Allergen Reactions   • Amoxicillin Other (See Comments)     Upset stomach    • Sulfa Antibiotics RASH       PAST SURGICAL HISTORY:  History reviewed. No pertinent surgical history.    FAMILY HISTORY:  History reviewed. No pertinent family history.    Patient's medications, allergies, past medical, surgical, social and family histories were reviewed and updated as appropriate.    Review of Systems  All other ROS reviewed negative except as mentioned in HPI       OBJECTIVE:     Physical Exam  Visit Vitals  /90   Pulse 102   Temp 97.9 °F (36.6 °C)   Wt 76.4 kg (168 lb 6.4 oz)   BMI 27.18 kg/m²       Nursing Notes Reviewed     CONSTITUTIONAL: alert, in no acute distress and current vital signs reviewed.   HEAD and FACE: atraumatic, no deformities, normocephalic, normal facies.   EYES: no discharge, normal conjunctiva, no eyelid swelling, no ptosis and the sclerae were normal. pupils equal, round and reactive to light and accommodation, conjugate gaze and extraocular movements were intact.   ENT: normal appearing outer ear, normal appearing nose. examination of the tympanic membrane showed normal landmarks, normal appearing external canal. nasal mucosa moist and pink, no nasal discharge. normal lips. oral mucosa pink and moist, no oral lesions.   NECK: normal appearing neck, supple neck and no mass was seen  LYMPHATIC: no lymphadenopathy.   CHEST: breast exam not done  PULMONARY: no respiratory distress, normal respiratory rate and effort and no accessory muscle use. breath  Right thigh sounds clear to auscultation bilaterally.   CV: normal rate, no murmurs were heard, regular rhythm, normal S1 and normal S2. edema was not present in the lower extremities.   ABDOMEN: soft, nontender, nondistended, normal bowel sounds and no abdominal mass. no hepatomegaly, no umbilical hernia was discovered.   MSK: normal gait. no musculoskeletal erythema was seen, no joint swelling seen and no joint tenderness was elicited. normal range of motion. there was no joint instability noted.   : Deferred  NEURO: cranial nerves grossly intact. normal DTRs. no sensory deficits noted, no coordination deficits. normal gait. muscle strength and tone were normal.   PSYCH: oriented to person, oriented to place and oriented to time. alert and awake, interactive and mood/affect were appropriate. judgement not impaired. normal attention span. short term memory intact.   SKIN, HAIR, NAILS: normal skin color and pigmentation and no rash. no foot ulcers and no skin ulcer was seen. normal skin turgor. no clubbing or cyanosis of the fingernails.    DIAGNOSTIC STUDIES:     LAB RESULTS: Lab Results Reviewed and   Lab Services on 04/16/2019   Component Date Value Ref Range Status   • Fasting Status 04/16/2019 NON  hrs Final   • Sodium 04/16/2019 138  135 - 145 mmol/L Final   • Potassium 04/16/2019 3.8  3.4 - 5.1 mmol/L Final   • Chloride 04/16/2019 101  98 - 107 mmol/L Final   • Carbon Dioxide 04/16/2019 27  21 - 32 mmol/L Final   • Anion Gap 04/16/2019 14  10 - 20 mmol/L Final   • Glucose 04/16/2019 90  65 - 99 mg/dL Final   • BUN 04/16/2019 16  6 - 20 mg/dL Final   • Creatinine 04/16/2019 0.89  0.51 - 0.95 mg/dL Final   • GFR Estimate,  04/16/2019 78   Final   • GFR Estimate, Non  04/16/2019 68   Final   • BUN/Creatinine Ratio 04/16/2019 18  7 - 25 Final   • CALCIUM 04/16/2019 9.3  8.4 - 10.2 mg/dL Final   Lab Services on 04/02/2019   Component Date Value Ref Range Status   • FASTING STATUS 04/02/2019  12  hrs Final   • CHOLESTEROL 04/02/2019 219* <200 mg/dL Final   • CALCULATED LDL 04/02/2019 139* <130 mg/dL Final   • HDL 04/02/2019 62  >49 mg/dL Final   • TRIGLYCERIDE 04/02/2019 89  <150 mg/dL Final   • CALCULATED NON HDL 04/02/2019 157  mg/dL Final   • CHOL/HDL 04/02/2019 3.5  <4.5 Final   • Hemoglobin A1C 04/02/2019 5.5  4.5 - 5.6 % Final   • Fasting Status 04/02/2019 12  hrs Final   • Sodium 04/02/2019 137  135 - 145 mmol/L Final   • Potassium 04/02/2019 4.2  3.4 - 5.1 mmol/L Final   • Chloride 04/02/2019 100  98 - 107 mmol/L Final   • Carbon Dioxide 04/02/2019 30  21 - 32 mmol/L Final   • Anion Gap 04/02/2019 11  10 - 20 mmol/L Final   • Glucose 04/02/2019 111* 65 - 99 mg/dL Final   • BUN 04/02/2019 22* 6 - 20 mg/dL Final   • Creatinine 04/02/2019 1.19* 0.51 - 0.95 mg/dL Final   • GFR Estimate,  04/02/2019 55   Final   • GFR Estimate, Non  04/02/2019 48   Final   • BUN/Creatinine Ratio 04/02/2019 19  7 - 25 Final   • CALCIUM 04/02/2019 10.2  8.4 - 10.2 mg/dL Final   • TOTAL BILIRUBIN 04/02/2019 0.5  0.2 - 1.0 mg/dL Final   • AST/SGOT 04/02/2019 14  <38 Units/L Final   • ALT/SGPT 04/02/2019 16  <79 Units/L Final   • ALK PHOSPHATASE 04/02/2019 104  45 - 117 Units/L Final   • TOTAL PROTEIN 04/02/2019 7.7  6.4 - 8.2 g/dL Final   • Albumin 04/02/2019 4.6  3.6 - 5.1 g/dL Final   • GLOBULIN 04/02/2019 3.1  2.0 - 4.0 g/dL Final   • A/G Ratio, Serum 04/02/2019 1.5  1.0 - 2.4 Final   • WBC 04/02/2019 7.3  4.2 - 11.0 K/mcL Final   • RBC 04/02/2019 4.70  4.00 - 5.20 mil/mcL Final   • HGB 04/02/2019 14.5  12.0 - 15.5 g/dL Final   • HCT 04/02/2019 43.5  36.0 - 46.5 % Final   • MCV 04/02/2019 92.6  78.0 - 100.0 fl Final   • MCH 04/02/2019 30.9  26.0 - 34.0 pg Final   • MCHC 04/02/2019 33.3  32.0 - 36.5 g/dL Final   • RDW-CV 04/02/2019 12.9  11.0 - 15.0 % Final   • PLT 04/02/2019 336  140 - 450 K/mcL Final   • NRBC 04/02/2019 0  0 /100 WBC Final   • DIFF TYPE 04/02/2019 AUTOMATED DIFFERENTIAL    Final   • Neutrophil 04/02/2019 65  % Final   • LYMPH 04/02/2019 26  % Final   • MONO 04/02/2019 8  % Final   • EOSIN 04/02/2019 0  % Final   • BASO 04/02/2019 1  % Final   • Percent Immature Granuloctyes 04/02/2019 0  % Final   • Absolute Neutrophil 04/02/2019 4.8  1.8 - 7.7 K/mcL Final   • Absolute Lymph 04/02/2019 1.9  1.0 - 4.0 K/mcL Final   • Absolute Mono 04/02/2019 0.6  0.3 - 0.9 K/mcL Final   • Absolute Eos 04/02/2019 0.0* 0.1 - 0.5 K/mcL Final   • Absolute Baso 04/02/2019 0.1  0.0 - 0.3 K/mcL Final   • Absolute Immature Granulocytes 04/02/2019 0.0  0 - 0.2 K/mcl Final   Office Visit on 03/26/2019   Component Date Value Ref Range Status   • VITAMIND, 25 HYDROXY 04/02/2019 17.1* 30.0 - 100.0 ng/mL Final          Assessment AND PLAN:     (Q76.6,  Q76.7) Congenital anomaly of ribs and sternum  (primary encounter diagnosis)  Comment: chronic pain 2/2 above, gabapentin with some pain relief. Recommend evaluation with Dr Renteria and add on Duloxetine for pain control   Plan: SERVICE TO SURGERY THORACIC, gabapentin         (NEURONTIN) 100 MG capsule, DULoxetine         (CYMBALTA) 20 MG capsule          (I10) Essential hypertension  Comment: Hypertension:  controlled  - BP goal in this patient over 60 without diabetes or chronic kidney disease is <150/90 by JNC 8 guidelines.  Patient education completed on exercise, diet and weight loss    Discussed standard DASH diet  Continue current medication regimen unchanged  Recommended to check blood pressure at home  Compliance with medication that is given  Discussed the importance of medications    Recommended to reduce caffeine intake    Recommended to use NSAIDs sparingly  Plan: losartan-hydrochlorothiazide (HYZAAR) 100-25 MG 0.5 tab daily    (G89.4) Chronic pain syndrome  Comment: increase gabapentin to 2 tabs at bedtime, 1 tab in AM and 1 tab during the day prn. Start Cymbalta, SERD  Plan: gabapentin (NEURONTIN) 100 MG capsule,         DULoxetine (CYMBALTA) 20 MG  capsule          (E55.9) Vitamin D deficiency  Comment: restart Vitamin D   Plan: Vitamin D, Ergocalciferol, 23823 units capsule          (Z78.0) Post-menopausal  (Z13.820) Screening for osteoporosis  Comment: screen for osteoporosis  Plan: BD DEXA AXIAL SKELETON          (M25.551) Right hip pain  (M25.561,  M25.562) Acute pain of both knees  Comment: MSK Plan:  - Refer to orders  - Activity as tolerated  - Apply ice or heat, whichever feels better  - Discussed HEP   - Follow instructions given  - Take medication as directed  Plan: XR HIP RIGHT 2 VIEWS W PELVIS 2 VIEWS, XR KNEES STANDING BILATERAL AP AND PA 2 VIEWS          (F32.0) Current mild episode of major depressive disorder, unspecified whether recurrent (CMS/HCC)  Comment: willing to try Cymbalta for chronic pain control. Reports depression is 2/2 chronic pain    Advance Care Planning Addressed:   - Advance Directives - patient to bring in records at next visit or can send via portal  - Total time spent discussing Advance Care Planning: 10 minutes  - Advance Care Planning discussed with patient, family    Immunizations recommendations reviewed   Cancer Screening Recommendations Reviewed: breast, cervical, colon, ovarian, lung  Refer to orders  Preventative diet & exercise related lifestyle changes discussed and recommended  Weight goals reviewed & discussed  Injury prevention counseling completed: falls,seat belts, helmets, smoke detectors, safe storage of fire-arms, internet safety & sun safety discussed  Counseled on safe sex practices    Medication changes: Yes.  Patient was advised on side effects and interactions of the new medication.  Also understands risks of not taking medications or adhering to medication schedule.    Instructions provided as documented in the AVS.    The patient indicated understanding of the diagnosis and agreed with the plan of care.    Red flag s/s reviewed and discussed for conditions listed including concerns for prompt ED  evaluation  Medical compliance with plan discussed and risks of non-compliance reviewed  Education completed on disease process, etiology & prognosis  Proper usage and side effects of medications reviewed & discussed    Return to clinic as clinically indicated or as discussed     Return in about 6 weeks (around 6/28/2019).    Jose Fischer MD

## 2022-02-03 NOTE — OB PROVIDER DELIVERY SUMMARY - NSPROVIDERDELIVERYNOTE_OBGYN_ALL_OB_FT
Classical  delivery of 25 week fetus with growth restriction and abnormal UA and ductus venosus Doppler studies. Infant cord clamped x 2 and cut and handed to awaiting NICU team. Apgars unknown at time of this note. Classical hysterotomy closed in 2 layers.  and LR in was . No complications of procedure.     Sarah Heard MD

## 2022-02-03 NOTE — OB RN INTRAOPERATIVE NOTE - NSSELHIDDEN_OBGYN_ALL_OB_FT
[NS_DeliveryAttending1_OBGYN_ALL_OB_FT:[id number]==],[NS_DeliveryRN_OBGYN_ALL_OB_FT:[id number]]

## 2022-02-03 NOTE — PROGRESS NOTE ADULT - TIME BILLING
I saw and evaluated Ms. Amador and agree with assessment and plan as above.   Labs are unchanged and not consistent with HELLP syndrome although she continues to be hemoconcentrated.   VSS afebrile.   Next Growth Scan 2/2 - will revisit patient's desires for fetal monitoring and delivery at that time. For now, daily fetal heart rate checks. She understands the risk of fetal demise.     All questions answered.     Sarah Heard MD
I saw and evaluated Ms. Amador and counseled her and her partner at bedside.   BPP and Dopplers repeated today. There is persistent reversal of flow during diastole in the umbilical artery Doppler and reversal of the A wave in the ductus venosus Doppler.   FHR tracing baseline 145, minimal variability with spontaneous decelerations.   Magnesium bolus started.   She received betamethasone yesterday and this morning.     I counseled her that Doppler and FHR findings are concerning for impending fetal demise.   She desires classical  and all available  interventions even given EFW 355g.   NICU, anesthesia, housestaff and nursing teams are prepared.   To OR for primary  delivery.   Consent has been signed and placed on chart.     All questions were answered to her apparent satisfaction.     Sarah Heard MD
periviable PEC with severe features
periviable PEC with severe features
I saw and evaluated Ms. Amador and agree with assessment and plan as above.   No fetal monitoring at this time. Daily FH checks only.   Deliver for maternal indications (HELLP, eclampsia, worsening renal function or abnormal hepatic function, persistent headaches, uncontrolled hypertension, or pulmonary edema) only. She understands that there is a risk of fetal demise.     All questions were answered.   Sarah Heard MD
PEC with severe features
I saw and evaluated Ms. Amador and agree with above.   Sarah Heard MD
I saw and evaluated Ms. Amador and agree with assessment and plan as above.     If she needs increasing Procardia for BP control, delivery should be strongly considered.   If labs are worsening or she develops HELLP syndrome or eclampsia, delivery should be emergent.  Twice weekly Dopplers to detect reversal of flow and daily FH checks only. She understands the risk of fetal demise.   Repeat growth in 10 days and then will decide on monitoring either at that point or at 26 weeks, she is still undecided at this point.   Betamethasone should be held until monitoring decision is finalized. I explained that betamethasone is most efficacious 5 - 7 days post course.     All questions were answered to the best of our ability.   Sarah Heard MD

## 2022-02-03 NOTE — OB PROVIDER DELIVERY SUMMARY - NSPOSTOPDXA_OBGYN_ALL_OB
Same as Pre-Op Diagnosis
You can access the FollowMyHealth Patient Portal offered by [facility] by registering at the following website: [email or web address]. By joining [facility]’s FollowMyHealth portal, you will also be able to view your health information using other applications (apps) compatible with our system.

## 2022-02-03 NOTE — PROGRESS NOTE ADULT - SUBJECTIVE AND OBJECTIVE BOX
R3 Antepartum Note, HD#16    Patient seen and examined at bedside, no acute overnight events. No acute complaints. Pt reports +FM, denies LOF, VB, ctx, HA, epigastric pain, blurred vision, CP, SOB, N/V, fevers, and chills.    Vital Signs Last 24 Hours  T(C): 36.7 (02-03-22 @ 05:05), Max: 36.9 (02-02-22 @ 21:34)  HR: 107 (02-03-22 @ 05:05) (87 - 118)  BP: 128/72 (02-03-22 @ 05:05) (118/63 - 158/97)  RR: 18 (02-03-22 @ 05:05) (16 - 18)  SpO2: 98% (02-03-22 @ 05:05) (93% - 100%)    Physical Exam:  General: NAD  Abdomen: Soft, non-tender, gravid  Ext: No pain or swelling      Labs:             14.4   11.00 )-----------( 267      ( 02-01 @ 07:16 )             40.8     02-01 @ 07:16    136  |  101  |  7   ----------------------------<  66  3.9   |  21  |  0.66    Ca    9.2      02-01 @ 07:16    TPro  7.1  /  Alb  3.7  /  TBili  0.3  /  DBili  x   /  AST  19  /  ALT  19  /  AlkPhos  116  02-01 @ 07:16    PT/INR - ( 02-01 @ 07:16 )   PT: 11.5 sec;   INR: 1.00 ratio    PTT - ( 02-01 @ 07:16 )  PTT:36.2 sec    Uric Acid: (02-01 @ 07:16)  3.9      Fibrinogen: (02-01 @ 07:16)  614      LDH: (02-01 @ 07:16)  190        MEDICATIONS  (STANDING):  magnesium sulfate  IVPB 4 Gram(s) IV Intermittent once  magnesium sulfate Infusion 2 Gm/Hr (50 mL/Hr) IV Continuous <Continuous>  melatonin 6 milliGRAM(s) Oral at bedtime  NIFEdipine XL 90 milliGRAM(s) Oral daily  prenatal multivitamin 1 Tablet(s) Oral daily    MEDICATIONS  (PRN):   R3 Antepartum Note, HD#16    Patient seen and examined at bedside, no acute overnight events. No acute complaints. Pt reports +FM, denies LOF, VB, ctx, HA, epigastric pain, blurred vision, CP, SOB, N/V, fevers, and chills.    Vital Signs Last 24 Hours  T(C): 36.7 (02-03-22 @ 05:05), Max: 36.9 (02-02-22 @ 21:34)  HR: 107 (02-03-22 @ 05:05) (87 - 118)  BP: 128/72 (02-03-22 @ 05:05) (118/63 - 158/97)  RR: 18 (02-03-22 @ 05:05) (16 - 18)  SpO2: 98% (02-03-22 @ 05:05) (93% - 100%)    Physical Exam:  General: NAD  Abdomen: Soft, non-tender, gravid  Ext: No pain or swelling      Labs:             14.4   11.00 )-----------( 267      ( 02-01 @ 07:16 )             40.8     02-01 @ 07:16    136  |  101  |  7   ----------------------------<  66  3.9   |  21  |  0.66    Ca    9.2      02-01 @ 07:16    TPro  7.1  /  Alb  3.7  /  TBili  0.3  /  DBili  x   /  AST  19  /  ALT  19  /  AlkPhos  116  02-01 @ 07:16    PT/INR - ( 02-01 @ 07:16 )   PT: 11.5 sec;   INR: 1.00 ratio    PTT - ( 02-01 @ 07:16 )  PTT:36.2 sec    Uric Acid: (02-01 @ 07:16)  3.9      Fibrinogen: (02-01 @ 07:16)  614      LDH: (02-01 @ 07:16)  190        MEDICATIONS  (STANDING):  magnesium sulfate  IVPB 4 Gram(s) IV Intermittent once  magnesium sulfate Infusion 2 Gm/Hr (50 mL/Hr) IV Continuous <Continuous>  melatonin 6 milliGRAM(s) Oral at bedtime  NIFEdipine XL 90 milliGRAM(s) Oral daily  prenatal multivitamin 1 Tablet(s) Oral daily

## 2022-02-03 NOTE — OB NEONATOLOGY/PEDIATRICIAN DELIVERY SUMMARY - NSPEDSNEONOTESA_OBGYN_ALL_OB_FT
Baby is a 25 and 1 week M born via CS to a 26yo  mother with a history of PCOS. During this pregnancy, she has been monitored for severe PEC, baby severely IUGR with a recent estimated fetal weight of 389g. AEDV with reversal of flow, prompting decision to proceed with CS. Mother is O+, GBS+, PNL neg/NR/I . She is beta complete 2/2-2/3, on Mg. Classical CS with cat 2 tracing. Baby emerged with no respiratory effort, WDSS, placed in thermal bag on warm mattress. Initial PPV 20/5, 21%, increased step-wise to 100% with gradual response. Baby with improved tone, respiratory effort and HR. HR >60 at 1 MOL, improved to >100. Baby continued to be given PPV for intermittent apnea with increased WOB- 's, sats 80's and so the decision was made to intubate at that point. Intubated at 11 MOL, curosurf given @16MOL, Fi02 weaned down gradually to 21% with sats 90-95%. Temp prior to transfer 36.6. Dr. Kianna Rainey and Rhona Bucio present for delivery and resuscitation of the infant. Weight on admission 335g. APGARS 4,7,8.

## 2022-02-03 NOTE — PROGRESS NOTE ADULT - REASON FOR ADMISSION
Mode of arrival (squad #, walk in, police, etc) : walk in        Chief complaint(s): Flank pain        Arrival Note (brief scenario, treatment PTA, etc). : Pt reporting flank pain with history of kidney stones. Denies abd/chest pain, N/V. Pt A&Ox4, NAD, respirations even and unlabored with no increased WOB or SOB, ambulatory with steady gait. C= \"Have you ever felt that you should Cut down on your drinking? \"  No  A= \"Have people Annoyed you by criticizing your drinking? \"  No  G= \"Have you ever felt bad or Guilty about your drinking? \"  No  E= \"Have you ever had a drink as an Eye-opener first thing in the morning to steady your nerves or to help a hangover? \"  No      Deferred []      Reason for deferring: N/A    *If yes to two or more: probable alcohol abuse. Stew Ledesma RN  07/15/21 [id number]
Preeclampsia with severe features at 23 weeks gestational age
Preeclampsia with Severe Features
Preeclampsia with Severe Features
Preeclampsia with severe features at 23 weeks gestational age
Preeclampsia with severe features at 23 weeks gestational age
Preeclampsia with Severe Features
sPEC
Preeclampsia with severe features
Preeclampsia with severe features at 23 weeks gestational age

## 2022-02-04 DIAGNOSIS — O16.9 UNSPECIFIED MATERNAL HYPERTENSION, UNSPECIFIED TRIMESTER: ICD-10-CM

## 2022-02-04 LAB
ALBUMIN SERPL ELPH-MCNC: 3.3 G/DL — SIGNIFICANT CHANGE UP (ref 3.3–5)
ALP SERPL-CCNC: 112 U/L — SIGNIFICANT CHANGE UP (ref 40–120)
ALT FLD-CCNC: 19 U/L — SIGNIFICANT CHANGE UP (ref 4–33)
ANION GAP SERPL CALC-SCNC: 16 MMOL/L — HIGH (ref 7–14)
APTT BLD: 28.8 SEC — SIGNIFICANT CHANGE UP (ref 27–36.3)
AST SERPL-CCNC: 24 U/L — SIGNIFICANT CHANGE UP (ref 4–32)
BASOPHILS # BLD AUTO: 0.02 K/UL — SIGNIFICANT CHANGE UP (ref 0–0.2)
BASOPHILS NFR BLD AUTO: 0.1 % — SIGNIFICANT CHANGE UP (ref 0–2)
BILIRUB SERPL-MCNC: <0.2 MG/DL — SIGNIFICANT CHANGE UP (ref 0.2–1.2)
BUN SERPL-MCNC: 8 MG/DL — SIGNIFICANT CHANGE UP (ref 7–23)
CALCIUM SERPL-MCNC: 6.2 MG/DL — CRITICAL LOW (ref 8.4–10.5)
CHLORIDE SERPL-SCNC: 98 MMOL/L — SIGNIFICANT CHANGE UP (ref 98–107)
CMV IGG FLD QL: 4.3 U/ML — HIGH
CMV IGG SERPL-IMP: POSITIVE
CMV IGM SERPL-ACNC: <30 AU/ML — SIGNIFICANT CHANGE UP (ref 0–29.9)
CO2 SERPL-SCNC: 20 MMOL/L — LOW (ref 22–31)
CREAT SERPL-MCNC: 0.64 MG/DL — SIGNIFICANT CHANGE UP (ref 0.5–1.3)
EOSINOPHIL # BLD AUTO: 0 K/UL — SIGNIFICANT CHANGE UP (ref 0–0.5)
EOSINOPHIL NFR BLD AUTO: 0 % — SIGNIFICANT CHANGE UP (ref 0–6)
FIBRINOGEN PPP-MCNC: 514 MG/DL — SIGNIFICANT CHANGE UP (ref 290–520)
GLUCOSE SERPL-MCNC: 160 MG/DL — HIGH (ref 70–99)
HCT VFR BLD CALC: 36.7 % — SIGNIFICANT CHANGE UP (ref 34.5–45)
HGB BLD-MCNC: 12.7 G/DL — SIGNIFICANT CHANGE UP (ref 11.5–15.5)
HSV 1+2 IGM SER-IMP: <0.91 RATIO — SIGNIFICANT CHANGE UP (ref 0–0.9)
IANC: 15.82 K/UL — HIGH (ref 1.5–8.5)
IMM GRANULOCYTES NFR BLD AUTO: 0.6 % — SIGNIFICANT CHANGE UP (ref 0–1.5)
INR BLD: 0.96 RATIO — SIGNIFICANT CHANGE UP (ref 0.88–1.16)
LDH SERPL L TO P-CCNC: 286 U/L — HIGH (ref 135–225)
LYMPHOCYTES # BLD AUTO: 13.5 % — SIGNIFICANT CHANGE UP (ref 13–44)
LYMPHOCYTES # BLD AUTO: 2.75 K/UL — SIGNIFICANT CHANGE UP (ref 1–3.3)
MAGNESIUM SERPL-MCNC: 6.2 MG/DL — HIGH (ref 1.6–2.6)
MAGNESIUM SERPL-MCNC: 6.6 MG/DL — HIGH (ref 1.6–2.6)
MCHC RBC-ENTMCNC: 30 PG — SIGNIFICANT CHANGE UP (ref 27–34)
MCHC RBC-ENTMCNC: 34.6 GM/DL — SIGNIFICANT CHANGE UP (ref 32–36)
MCV RBC AUTO: 86.6 FL — SIGNIFICANT CHANGE UP (ref 80–100)
MEV IGM SER-ACNC: <20 AU/ML — SIGNIFICANT CHANGE UP (ref 0–19.9)
MONOCYTES # BLD AUTO: 1.69 K/UL — HIGH (ref 0–0.9)
MONOCYTES NFR BLD AUTO: 8.3 % — SIGNIFICANT CHANGE UP (ref 2–14)
NEUTROPHILS # BLD AUTO: 15.82 K/UL — HIGH (ref 1.8–7.4)
NEUTROPHILS NFR BLD AUTO: 77.5 % — HIGH (ref 43–77)
NRBC # BLD: 0 /100 WBCS — SIGNIFICANT CHANGE UP
NRBC # FLD: 0 K/UL — SIGNIFICANT CHANGE UP
PLATELET # BLD AUTO: 287 K/UL — SIGNIFICANT CHANGE UP (ref 150–400)
POTASSIUM SERPL-MCNC: 4.2 MMOL/L — SIGNIFICANT CHANGE UP (ref 3.5–5.3)
POTASSIUM SERPL-SCNC: 4.2 MMOL/L — SIGNIFICANT CHANGE UP (ref 3.5–5.3)
PROT SERPL-MCNC: 6.5 G/DL — SIGNIFICANT CHANGE UP (ref 6–8.3)
PROTHROM AB SERPL-ACNC: 11.1 SEC — SIGNIFICANT CHANGE UP (ref 10.6–13.6)
RBC # BLD: 4.24 M/UL — SIGNIFICANT CHANGE UP (ref 3.8–5.2)
RBC # FLD: 12.4 % — SIGNIFICANT CHANGE UP (ref 10.3–14.5)
SODIUM SERPL-SCNC: 134 MMOL/L — LOW (ref 135–145)
T GONDII IGM SER IA-ACNC: <3 AU/ML — SIGNIFICANT CHANGE UP (ref 0–7.9)
URATE SERPL-MCNC: 4 MG/DL — SIGNIFICANT CHANGE UP (ref 2.5–7)
WBC # BLD: 20.41 K/UL — HIGH (ref 3.8–10.5)
WBC # FLD AUTO: 20.41 K/UL — HIGH (ref 3.8–10.5)

## 2022-02-04 RX ORDER — NIFEDIPINE 30 MG
90 TABLET, EXTENDED RELEASE 24 HR ORAL DAILY
Refills: 0 | Status: DISCONTINUED | OUTPATIENT
Start: 2022-02-04 | End: 2022-02-07

## 2022-02-04 RX ORDER — IBUPROFEN 200 MG
600 TABLET ORAL EVERY 6 HOURS
Refills: 0 | Status: DISCONTINUED | OUTPATIENT
Start: 2022-02-04 | End: 2022-02-07

## 2022-02-04 RX ADMIN — Medication 975 MILLIGRAM(S): at 17:23

## 2022-02-04 RX ADMIN — Medication 600 MILLIGRAM(S): at 17:22

## 2022-02-04 RX ADMIN — HEPARIN SODIUM 5000 UNIT(S): 5000 INJECTION INTRAVENOUS; SUBCUTANEOUS at 10:17

## 2022-02-04 RX ADMIN — Medication 1 TABLET(S): at 11:30

## 2022-02-04 RX ADMIN — Medication 975 MILLIGRAM(S): at 18:00

## 2022-02-04 RX ADMIN — Medication 975 MILLIGRAM(S): at 04:45

## 2022-02-04 RX ADMIN — Medication 30 MILLIGRAM(S): at 00:29

## 2022-02-04 RX ADMIN — OXYCODONE HYDROCHLORIDE 5 MILLIGRAM(S): 5 TABLET ORAL at 17:22

## 2022-02-04 RX ADMIN — Medication 30 MILLIGRAM(S): at 00:10

## 2022-02-04 RX ADMIN — Medication 600 MILLIGRAM(S): at 18:00

## 2022-02-04 RX ADMIN — Medication 975 MILLIGRAM(S): at 10:17

## 2022-02-04 RX ADMIN — OXYCODONE HYDROCHLORIDE 5 MILLIGRAM(S): 5 TABLET ORAL at 18:00

## 2022-02-04 RX ADMIN — Medication 30 MILLIGRAM(S): at 06:00

## 2022-02-04 RX ADMIN — Medication 30 MILLIGRAM(S): at 10:45

## 2022-02-04 RX ADMIN — Medication 30 MILLIGRAM(S): at 10:17

## 2022-02-04 RX ADMIN — Medication 975 MILLIGRAM(S): at 10:45

## 2022-02-04 RX ADMIN — Medication 6 MILLIGRAM(S): at 22:34

## 2022-02-04 RX ADMIN — Medication 975 MILLIGRAM(S): at 03:55

## 2022-02-04 RX ADMIN — MAGNESIUM HYDROXIDE 30 MILLILITER(S): 400 TABLET, CHEWABLE ORAL at 22:34

## 2022-02-04 RX ADMIN — Medication 30 MILLIGRAM(S): at 05:45

## 2022-02-04 RX ADMIN — HEPARIN SODIUM 5000 UNIT(S): 5000 INJECTION INTRAVENOUS; SUBCUTANEOUS at 22:34

## 2022-02-04 RX ADMIN — Medication 90 MILLIGRAM(S): at 10:18

## 2022-02-04 NOTE — PROGRESS NOTE ADULT - SUBJECTIVE AND OBJECTIVE BOX
Pain Service Follow-up  Postop Day  1    S/P  C- Section    T(C): 36.6 (02-04-22 @ 04:00), Max: 36.7 (02-03-22 @ 12:25)  HR: 75 (02-04-22 @ 10:01) (74 - 96)  BP: 113/82 (02-04-22 @ 10:01) (106/78 - 135/93)  RR: 18 (02-04-22 @ 10:01) (15 - 20)  SpO2: 88% (02-04-22 @ 10:01) (88% - 100%)  Wt(kg): --      THERAPY:  Spinal Morphine     Sedation Score:	  [X] Alert	      [  ] Drowsy       [  ] Arousable	[  ] Asleep         [  ] Unresponsive    Side Effects:	  [X] None	      [  ] Nausea       [  ] Pruritus        [  ] Weakness   [  ] Numbness        ASSESSMENT/ PLAN   [ X ] Discontinue         [  ] Continue    [ X ] Documentation and Verification of current medications       Satisfactory Post Anesthetic Course

## 2022-02-04 NOTE — PROGRESS NOTE ADULT - SUBJECTIVE AND OBJECTIVE BOX
OB Progress Note:  Delivery, POD#1    S: 26yo POD#1 s/p pCCS . Her pain is well controlled. She is tolerating a regular diet and passing flatus. Denies N/V. Denies CP/SOB/lightheadedness/dizziness. Pt denies sxs of PEC: denies headache, visual changes, RUQ pain, respiratory distress  She is ambulating without difficulty.   Voiding spontaneously.     O:   Vital Signs Last 24 Hrs  T(C): 36.6 (2022 04:00), Max: 36.7 (2022 12:25)  T(F): 97.8 (2022 04:00), Max: 98.1 (2022 12:25)  HR: 74 (2022 05:50) (74 - 117)  BP: 106/80 (2022 05:50) (106/78 - 146/95)  BP(mean): 81 (2022 16:00) (81 - 101)  RR: 18 (2022 05:50) (15 - 20)  SpO2: 99% (2022 05:50) (98% - 100%)    Labs:  Blood type: O Positive  Rubella IgG: RPR: Negative                          13.7   13.62<H> >-----------< 280    (  @ 07:02 )             40.4                        14.4   11.00<H> >-----------< 267    (  @ 07:16 )             40.8    22 @ 07:02      131<L>  |  99  |  8   ----------------------------<  183<H>  4.0   |  17<L>  |  0.55    22 @ 07:16      136  |  101  |  7   ----------------------------<  66<L>  3.9   |  21<L>  |  0.66        Ca    9.0      2022 07:02  Ca    9.2      2022 07:16  Mg     6.20<H>     02-04  Mg     6.50<H>     02-03    TPro  7.2  /  Alb  3.7  /  TBili  <0.2  /  DBili  x   /  AST  21  /  ALT  20  /  AlkPhos  117  22 @ 07:02  TPro  7.1  /  Alb  3.7  /  TBili  0.3  /  DBili  x   /  AST  19  /  ALT  19  /  AlkPhos  116  22 @ 07:16      PE:  General: NAD  Abdomen: Mildly distended, appropriately tender, incision c/d/i.  Extremities: No erythema, no pitting edema

## 2022-02-04 NOTE — PROGRESS NOTE ADULT - ASSESSMENT
A/P: 28yo POD#1 s/p pCCS.  Patient is stable and doing well post-operatively.    - Continue regular diet.  - Increase ambulation.  - Continue motrin, tylenol, oxycodone PRN for pain control.   - F/u AM CBC    Mason Messer PGY3  A/P: 26yo POD#1 s/p pCCS.  Patient is stable and doing well post-operatively.    - Continue regular diet.  - Increase ambulation.  - Continue motrin, tylenol, oxycodone PRN for pain control.   - F/u AM CBC    Mason Messer PGY3     MFM Fellow addendum:     26 yo now  on POD1 sp classical CD at 25w1d for sIUGR with REDV recovering appropriately. Pian well controlled, ambulating, tolerating PO and overall doing well.   Candidate for Lucas baird given anticipated prolonged NICU stay for baby. SW to follow.     FELECIA Amaya Fellow   See and d/w FELECIA Ritchie Attenidng

## 2022-02-05 RX ORDER — OXYCODONE HYDROCHLORIDE 5 MG/1
5 TABLET ORAL ONCE
Refills: 0 | Status: DISCONTINUED | OUTPATIENT
Start: 2022-02-05 | End: 2022-02-05

## 2022-02-05 RX ADMIN — Medication 600 MILLIGRAM(S): at 00:03

## 2022-02-05 RX ADMIN — Medication 975 MILLIGRAM(S): at 17:50

## 2022-02-05 RX ADMIN — OXYCODONE HYDROCHLORIDE 5 MILLIGRAM(S): 5 TABLET ORAL at 16:09

## 2022-02-05 RX ADMIN — Medication 975 MILLIGRAM(S): at 18:15

## 2022-02-05 RX ADMIN — Medication 975 MILLIGRAM(S): at 12:45

## 2022-02-05 RX ADMIN — HEPARIN SODIUM 5000 UNIT(S): 5000 INJECTION INTRAVENOUS; SUBCUTANEOUS at 21:51

## 2022-02-05 RX ADMIN — HEPARIN SODIUM 5000 UNIT(S): 5000 INJECTION INTRAVENOUS; SUBCUTANEOUS at 09:56

## 2022-02-05 RX ADMIN — Medication 600 MILLIGRAM(S): at 23:49

## 2022-02-05 RX ADMIN — Medication 1 TABLET(S): at 09:56

## 2022-02-05 RX ADMIN — Medication 975 MILLIGRAM(S): at 12:15

## 2022-02-05 RX ADMIN — Medication 600 MILLIGRAM(S): at 18:15

## 2022-02-05 RX ADMIN — Medication 975 MILLIGRAM(S): at 00:55

## 2022-02-05 RX ADMIN — Medication 600 MILLIGRAM(S): at 17:50

## 2022-02-05 RX ADMIN — Medication 975 MILLIGRAM(S): at 06:49

## 2022-02-05 RX ADMIN — Medication 90 MILLIGRAM(S): at 16:11

## 2022-02-05 RX ADMIN — Medication 600 MILLIGRAM(S): at 06:49

## 2022-02-05 RX ADMIN — Medication 975 MILLIGRAM(S): at 23:49

## 2022-02-05 RX ADMIN — OXYCODONE HYDROCHLORIDE 5 MILLIGRAM(S): 5 TABLET ORAL at 16:30

## 2022-02-05 RX ADMIN — Medication 600 MILLIGRAM(S): at 06:03

## 2022-02-05 RX ADMIN — Medication 975 MILLIGRAM(S): at 06:03

## 2022-02-05 RX ADMIN — Medication 600 MILLIGRAM(S): at 12:15

## 2022-02-05 RX ADMIN — Medication 975 MILLIGRAM(S): at 00:03

## 2022-02-05 RX ADMIN — Medication 6 MILLIGRAM(S): at 21:50

## 2022-02-05 RX ADMIN — Medication 600 MILLIGRAM(S): at 00:55

## 2022-02-05 RX ADMIN — Medication 600 MILLIGRAM(S): at 12:45

## 2022-02-05 NOTE — PROGRESS NOTE ADULT - SUBJECTIVE AND OBJECTIVE BOX
S: 26yo POD#2 s/p pCCS at 25w1d ga.  Pain is well controlled. She is tolerating a regular diet and passing flatus. She is voiding spontaneously, and ambulating without difficulty. Denies CP/SOB. Denies lightheadedness/dizziness. Denies N/V.    O:  Vitals:  Vital Signs Last 24 Hrs  T(C): 37.1 (05 Feb 2022 05:56), Max: 37.1 (05 Feb 2022 05:56)  T(F): 98.8 (05 Feb 2022 05:56), Max: 98.8 (05 Feb 2022 05:56)  HR: 81 (05 Feb 2022 05:56) (72 - 81)  BP: 98/69 (05 Feb 2022 05:56) (98/69 - 130/87)  BP(mean): --  RR: 18 (05 Feb 2022 05:56) (17 - 18)  SpO2: 97% (05 Feb 2022 05:56) (97% - 100%)    MEDICATIONS  (STANDING):  acetaminophen     Tablet .. 975 milliGRAM(s) Oral <User Schedule>  diphtheria/tetanus/pertussis (acellular) Vaccine (ADAcel) 0.5 milliLiter(s) IntraMuscular once  heparin   Injectable 5000 Unit(s) SubCutaneous every 12 hours  ibuprofen  Tablet. 600 milliGRAM(s) Oral every 6 hours  melatonin 6 milliGRAM(s) Oral at bedtime  NIFEdipine XL 90 milliGRAM(s) Oral daily  oxytocin Infusion 16.667 milliUNIT(s)/Min (50 mL/Hr) IV Continuous <Continuous>  prenatal multivitamin 1 Tablet(s) Oral daily      MEDICATIONS  (PRN):  dexAMETHasone  Injectable 4 milliGRAM(s) IV Push every 6 hours PRN Nausea  diphenhydrAMINE 25 milliGRAM(s) Oral every 6 hours PRN Pruritus  lanolin Ointment 1 Application(s) Topical every 6 hours PRN Sore Nipples  magnesium hydroxide Suspension 30 milliLiter(s) Oral two times a day PRN Constipation  nalbuphine Injectable 2.5 milliGRAM(s) IV Push every 6 hours PRN Pruritus  naloxone Injectable 0.1 milliGRAM(s) IV Push every 3 minutes PRN For ANY of the following changes in patient status:  A. Breaths Per Minute LESS THAN 10, B. Oxygen saturation LESS THAN 90%, C. Sedation score of 6 for Stop After: 4 Times  ondansetron Injectable 4 milliGRAM(s) IV Push every 6 hours PRN Nausea  oxyCODONE    IR 5 milliGRAM(s) Oral every 3 hours PRN Moderate to Severe Pain (4-10)  simethicone 80 milliGRAM(s) Chew every 4 hours PRN Gas      Labs:  Blood type: O Positive  Rubella IgG: RPR: Negative                          12.7   20.41<H> >-----------< 287    ( 02-04 @ 07:25 )             36.7                        13.7   13.62<H> >-----------< 280    ( 02-03 @ 07:02 )             40.4    02-04-22 @ 07:25      134<L>  |  98  |  8   ----------------------------<  160<H>  4.2   |  20<L>  |  0.64    02-03-22 @ 07:02      131<L>  |  99  |  8   ----------------------------<  183<H>  4.0   |  17<L>  |  0.55        Ca    6.2<LL>      04 Feb 2022 07:25  Ca    9.0      03 Feb 2022 07:02  Mg     6.60<H>     02-04  Mg     6.20<H>     02-04  Mg     6.50<H>     02-03    TPro  6.5  /  Alb  3.3  /  TBili  <0.2  /  DBili  x   /  AST  24  /  ALT  19  /  AlkPhos  112  02-04-22 @ 07:25  TPro  7.2  /  Alb  3.7  /  TBili  <0.2  /  DBili  x   /  AST  21  /  ALT  20  /  AlkPhos  117  02-03-22 @ 07:02          PE:  General: NAD  Abdomen: Soft, appropriately tender, incision c/d/i.  Extremities: No erythema, no pitting edema

## 2022-02-05 NOTE — PROGRESS NOTE ADULT - ASSESSMENT
A/P: 28yo POD#2 s/p pCCS complicated by SPEC requiring MG.  Patient is stable and doing well post-operatively.      #SPEC    - s/p Mg (1/19-1/20) (2/3-2/4)   - S/p Procardia 10 (1/24)   - Continue Procardia 90xl   - Blood pressure range as above. Appropriate overnight. No symptoms of SPEC this am.     #PP care   - Continue regular diet.  - Increase ambulation.  - Continue motrin, tylenol, oxycodone PRN for pain control  - Birth control options were reviewed with the patient. The importance of a 9 month interval between preganncy was stressed and explained. Pt is unsure on contraception at this time. All questions were answered. Pt verbalized understanding.     GENE Goyal MD  PGY-1

## 2022-02-06 RX ADMIN — Medication 600 MILLIGRAM(S): at 06:58

## 2022-02-06 RX ADMIN — Medication 90 MILLIGRAM(S): at 14:41

## 2022-02-06 RX ADMIN — Medication 975 MILLIGRAM(S): at 05:59

## 2022-02-06 RX ADMIN — Medication 600 MILLIGRAM(S): at 15:36

## 2022-02-06 RX ADMIN — Medication 600 MILLIGRAM(S): at 21:13

## 2022-02-06 RX ADMIN — Medication 600 MILLIGRAM(S): at 14:37

## 2022-02-06 RX ADMIN — Medication 600 MILLIGRAM(S): at 00:49

## 2022-02-06 RX ADMIN — HEPARIN SODIUM 5000 UNIT(S): 5000 INJECTION INTRAVENOUS; SUBCUTANEOUS at 10:41

## 2022-02-06 RX ADMIN — Medication 975 MILLIGRAM(S): at 00:49

## 2022-02-06 RX ADMIN — Medication 975 MILLIGRAM(S): at 21:13

## 2022-02-06 RX ADMIN — Medication 6 MILLIGRAM(S): at 22:30

## 2022-02-06 RX ADMIN — Medication 600 MILLIGRAM(S): at 05:59

## 2022-02-06 RX ADMIN — Medication 975 MILLIGRAM(S): at 06:58

## 2022-02-06 RX ADMIN — Medication 975 MILLIGRAM(S): at 14:36

## 2022-02-06 RX ADMIN — Medication 975 MILLIGRAM(S): at 15:36

## 2022-02-06 RX ADMIN — Medication 600 MILLIGRAM(S): at 20:13

## 2022-02-06 RX ADMIN — HEPARIN SODIUM 5000 UNIT(S): 5000 INJECTION INTRAVENOUS; SUBCUTANEOUS at 22:29

## 2022-02-06 RX ADMIN — Medication 975 MILLIGRAM(S): at 20:13

## 2022-02-06 RX ADMIN — Medication 1 TABLET(S): at 10:40

## 2022-02-06 NOTE — PROGRESS NOTE ADULT - ASSESSMENT
A/P: 26yo POD#3 s/p pCCS complicated by SPEC requiring MG.  Patient is stable and doing well post-operatively.      #SPEC    - s/p Mg (1/19-1/20) (2/3-2/4)   - S/p Procardia 10 (1/24)   - Continue Procardia 90xl   - Blood pressure range as above. Appropriate overnight. No symptoms of SPEC this am.     #PP care   - Continue regular diet.  - Increase ambulation.  - Continue motrin, tylenol, oxycodone PRN for pain control    GENE Goyal MD  PGY-1

## 2022-02-06 NOTE — PROGRESS NOTE ADULT - SUBJECTIVE AND OBJECTIVE BOX
S: 26yo POD#3 s/p pCCS. The patient feels well.  Pain is well controlled. She is tolerating a regular diet and passing flatus. She is voiding spontaneously, and ambulating without difficulty. Denies CP/SOB. Denies lightheadedness/dizziness. Denies N/V.    O:  Vitals:  Vital Signs Last 24 Hrs  T(C): 37.2 (06 Feb 2022 05:57), Max: 37.3 (06 Feb 2022 01:59)  T(F): 99 (06 Feb 2022 05:57), Max: 99.1 (06 Feb 2022 01:59)  HR: 64 (06 Feb 2022 05:57) (64 - 85)  BP: 111/78 (06 Feb 2022 05:57) (90/62 - 122/84)  BP(mean): --  RR: 17 (06 Feb 2022 05:57) (16 - 17)  SpO2: 100% (06 Feb 2022 05:57) (99% - 100%)    MEDICATIONS  (STANDING):  acetaminophen     Tablet .. 975 milliGRAM(s) Oral <User Schedule>  diphtheria/tetanus/pertussis (acellular) Vaccine (ADAcel) 0.5 milliLiter(s) IntraMuscular once  heparin   Injectable 5000 Unit(s) SubCutaneous every 12 hours  ibuprofen  Tablet. 600 milliGRAM(s) Oral every 6 hours  melatonin 6 milliGRAM(s) Oral at bedtime  NIFEdipine XL 90 milliGRAM(s) Oral daily  oxytocin Infusion 16.667 milliUNIT(s)/Min (50 mL/Hr) IV Continuous <Continuous>  prenatal multivitamin 1 Tablet(s) Oral daily    MEDICATIONS  (PRN):  dexAMETHasone  Injectable 4 milliGRAM(s) IV Push every 6 hours PRN Nausea  diphenhydrAMINE 25 milliGRAM(s) Oral every 6 hours PRN Pruritus  lanolin Ointment 1 Application(s) Topical every 6 hours PRN Sore Nipples  magnesium hydroxide Suspension 30 milliLiter(s) Oral two times a day PRN Constipation  nalbuphine Injectable 2.5 milliGRAM(s) IV Push every 6 hours PRN Pruritus  naloxone Injectable 0.1 milliGRAM(s) IV Push every 3 minutes PRN For ANY of the following changes in patient status:  A. Breaths Per Minute LESS THAN 10, B. Oxygen saturation LESS THAN 90%, C. Sedation score of 6 for Stop After: 4 Times  ondansetron Injectable 4 milliGRAM(s) IV Push every 6 hours PRN Nausea  oxyCODONE    IR 5 milliGRAM(s) Oral every 3 hours PRN Moderate to Severe Pain (4-10)  oxyCODONE    IR 5 milliGRAM(s) Oral once PRN Moderate to Severe Pain (4-10)  simethicone 80 milliGRAM(s) Chew every 4 hours PRN Gas      LABS:  Blood type: O Positive  Rubella IgG: RPR: Negative                          12.7   20.41<H> >-----------< 287    ( 02-04 @ 07:25 )             36.7                        13.7   13.62<H> >-----------< 280    ( 02-03 @ 07:02 )             40.4    02-04-22 @ 07:25      134<L>  |  98  |  8   ----------------------------<  160<H>  4.2   |  20<L>  |  0.64    02-03-22 @ 07:02      131<L>  |  99  |  8   ----------------------------<  183<H>  4.0   |  17<L>  |  0.55        Ca    6.2<LL>      04 Feb 2022 07:25  Ca    9.0      03 Feb 2022 07:02  Mg     6.60<H>     02-04  Mg     6.20<H>     02-04  Mg     6.50<H>     02-03    TPro  6.5  /  Alb  3.3  /  TBili  <0.2  /  DBili  x   /  AST  24  /  ALT  19  /  AlkPhos  112  02-04-22 @ 07:25  TPro  7.2  /  Alb  3.7  /  TBili  <0.2  /  DBili  x   /  AST  21  /  ALT  20  /  AlkPhos  117  02-03-22 @ 07:02          Physical exam:  Gen: NAD  Abdomen: Soft, nontender, no distension , firm uterine fundus at umbilicus.  Incision: Clean, dry, and intact   Pelvic: Normal lochia noted  Ext: No calf tenderness

## 2022-02-07 ENCOUNTER — TRANSCRIPTION ENCOUNTER (OUTPATIENT)
Age: 27
End: 2022-02-07

## 2022-02-07 VITALS
RESPIRATION RATE: 17 BRPM | DIASTOLIC BLOOD PRESSURE: 86 MMHG | HEART RATE: 89 BPM | TEMPERATURE: 98 F | SYSTOLIC BLOOD PRESSURE: 116 MMHG | OXYGEN SATURATION: 100 %

## 2022-02-07 LAB
HCT VFR BLD CALC: 37.9 % — SIGNIFICANT CHANGE UP (ref 34.5–45)
HGB BLD-MCNC: 12.8 G/DL — SIGNIFICANT CHANGE UP (ref 11.5–15.5)
MCHC RBC-ENTMCNC: 29.6 PG — SIGNIFICANT CHANGE UP (ref 27–34)
MCHC RBC-ENTMCNC: 33.8 GM/DL — SIGNIFICANT CHANGE UP (ref 32–36)
MCV RBC AUTO: 87.7 FL — SIGNIFICANT CHANGE UP (ref 80–100)
NRBC # BLD: 0 /100 WBCS — SIGNIFICANT CHANGE UP
NRBC # FLD: 0 K/UL — SIGNIFICANT CHANGE UP
PLATELET # BLD AUTO: 281 K/UL — SIGNIFICANT CHANGE UP (ref 150–400)
RBC # BLD: 4.32 M/UL — SIGNIFICANT CHANGE UP (ref 3.8–5.2)
RBC # FLD: 12.9 % — SIGNIFICANT CHANGE UP (ref 10.3–14.5)
WBC # BLD: 15.62 K/UL — HIGH (ref 3.8–10.5)
WBC # FLD AUTO: 15.62 K/UL — HIGH (ref 3.8–10.5)

## 2022-02-07 RX ORDER — TETANUS TOXOID, REDUCED DIPHTHERIA TOXOID AND ACELLULAR PERTUSSIS VACCINE, ADSORBED 5; 2.5; 8; 8; 2.5 [IU]/.5ML; [IU]/.5ML; UG/.5ML; UG/.5ML; UG/.5ML
0.5 SUSPENSION INTRAMUSCULAR ONCE
Refills: 0 | Status: COMPLETED | OUTPATIENT
Start: 2022-02-07 | End: 2022-02-07

## 2022-02-07 RX ORDER — OXYCODONE HYDROCHLORIDE 5 MG/1
1 TABLET ORAL
Qty: 10 | Refills: 0
Start: 2022-02-07

## 2022-02-07 RX ORDER — NIFEDIPINE 30 MG
1 TABLET, EXTENDED RELEASE 24 HR ORAL
Qty: 0 | Refills: 0 | DISCHARGE

## 2022-02-07 RX ORDER — ASPIRIN/CALCIUM CARB/MAGNESIUM 324 MG
1 TABLET ORAL
Qty: 0 | Refills: 0 | DISCHARGE

## 2022-02-07 RX ORDER — IBUPROFEN 200 MG
1 TABLET ORAL
Qty: 0 | Refills: 0 | DISCHARGE

## 2022-02-07 RX ORDER — METFORMIN HYDROCHLORIDE 850 MG/1
1 TABLET ORAL
Qty: 0 | Refills: 0 | DISCHARGE

## 2022-02-07 RX ORDER — NIFEDIPINE 30 MG
1 TABLET, EXTENDED RELEASE 24 HR ORAL
Qty: 30 | Refills: 0
Start: 2022-02-07

## 2022-02-07 RX ORDER — ACETAMINOPHEN 500 MG
2 TABLET ORAL
Qty: 0 | Refills: 0 | DISCHARGE

## 2022-02-07 RX ADMIN — Medication 975 MILLIGRAM(S): at 11:01

## 2022-02-07 RX ADMIN — Medication 90 MILLIGRAM(S): at 09:56

## 2022-02-07 RX ADMIN — Medication 600 MILLIGRAM(S): at 11:01

## 2022-02-07 RX ADMIN — Medication 975 MILLIGRAM(S): at 12:00

## 2022-02-07 RX ADMIN — TETANUS TOXOID, REDUCED DIPHTHERIA TOXOID AND ACELLULAR PERTUSSIS VACCINE, ADSORBED 0.5 MILLILITER(S): 5; 2.5; 8; 8; 2.5 SUSPENSION INTRAMUSCULAR at 15:50

## 2022-02-07 RX ADMIN — Medication 1 TABLET(S): at 09:45

## 2022-02-07 RX ADMIN — Medication 975 MILLIGRAM(S): at 02:09

## 2022-02-07 RX ADMIN — Medication 975 MILLIGRAM(S): at 03:09

## 2022-02-07 RX ADMIN — HEPARIN SODIUM 5000 UNIT(S): 5000 INJECTION INTRAVENOUS; SUBCUTANEOUS at 09:48

## 2022-02-07 RX ADMIN — Medication 600 MILLIGRAM(S): at 12:00

## 2022-02-07 RX ADMIN — Medication 600 MILLIGRAM(S): at 02:10

## 2022-02-07 RX ADMIN — Medication 600 MILLIGRAM(S): at 03:09

## 2022-02-07 NOTE — DISCHARGE NOTE OB - HOSPITAL COURSE
27 yo  admitted with severe range blood pressures and proteinuria meeting criteria for sPEC. Fetus found to be of a non-viable weight with AEDV on UAD overall indicating poor prognosis. Patient counseled on diagnosis of sPEC and options at this age including, proceeding with pregnancy with understanding of risks of severe prematurity or interruption of pregnancy. BMZ given for fetal lung maturity. NICU consulted. Anti-hypertensives up-titrated to Procardia 90 XL. Repeat sono with minimal growth and REDV, recommendation made by MFM for delivery. Patient underwent uncomplicated pCCS at 25w1d. Patient’s postoperative course was unremarkable and she remained hemodynamically stable and afebrile throughout. Upon discharge on POD#4, the patient is ambulating and voiding spontaneously, tolerating oral intake, pain was well controlled with oral medication, and vital signs were stable. BPs have remained controlled with Procardia 90 XL. 27 yo  admitted with severe range blood pressures and proteinuria meeting criteria for sPEC. Fetus found to be of a non-viable weight with AEDV on UAD overall indicating poor prognosis. Patient counseled on diagnosis of sPEC and options at this age including, proceeding with pregnancy with understanding of risks of severe prematurity or interruption of pregnancy. BMZ given for fetal lung maturity. NICU consulted. Anti-hypertensives up-titrated to Procardia 90 XL. Repeat sono with minimal growth and REDV, recommendation made by MFM for delivery. Patient underwent uncomplicated pCCS at 25w1d. Pt remained hemodynamically stable and afebrile throughout. Upon discharge on POD#4, the patient is ambulating and voiding spontaneously, tolerating oral intake, pain was well controlled with oral medication, and vital signs were stable. BPs have remained controlled with Procardia 90 XL.

## 2022-02-07 NOTE — DISCHARGE NOTE OB - PATIENT PORTAL LINK FT
You can access the FollowMyHealth Patient Portal offered by [facility] by registering at the following website: [email or web address]. By joining [employer]’s FollowMyHealth portal, you will also be able to view your health information using other applications (apps) compatible with our system.

## 2022-02-07 NOTE — PROGRESS NOTE ADULT - SUBJECTIVE AND OBJECTIVE BOX
S: 26yo POD#4 s/p LTCS. The patient feels well.  Pain is well controlled. She is tolerating a regular diet and passing flatus. She is voiding spontaneously, and ambulating without difficulty. Denies CP/SOB. Denies lightheadedness/dizziness. Denies N/V. Denies HA, vision changes, or RUQ pain.     O:  Vitals:  Vital Signs Last 24 Hrs  T(C): 36.8 (07 Feb 2022 06:06), Max: 36.8 (07 Feb 2022 06:06)  T(F): 98.2 (07 Feb 2022 06:06), Max: 98.2 (07 Feb 2022 06:06)  HR: 81 (07 Feb 2022 06:06) (73 - 82)  BP: 119/85 (07 Feb 2022 06:06) (119/85 - 139/97)  BP(mean): --  RR: 17 (07 Feb 2022 06:06) (16 - 18)  SpO2: 100% (07 Feb 2022 06:06) (99% - 100%)    MEDICATIONS  (STANDING):  acetaminophen     Tablet .. 975 milliGRAM(s) Oral <User Schedule>  diphtheria/tetanus/pertussis (acellular) Vaccine (ADAcel) 0.5 milliLiter(s) IntraMuscular once  heparin   Injectable 5000 Unit(s) SubCutaneous every 12 hours  ibuprofen  Tablet. 600 milliGRAM(s) Oral every 6 hours  melatonin 6 milliGRAM(s) Oral at bedtime  NIFEdipine XL 90 milliGRAM(s) Oral daily  oxytocin Infusion 16.667 milliUNIT(s)/Min (50 mL/Hr) IV Continuous <Continuous>  prenatal multivitamin 1 Tablet(s) Oral daily    MEDICATIONS  (PRN):  dexAMETHasone  Injectable 4 milliGRAM(s) IV Push every 6 hours PRN Nausea  diphenhydrAMINE 25 milliGRAM(s) Oral every 6 hours PRN Pruritus  lanolin Ointment 1 Application(s) Topical every 6 hours PRN Sore Nipples  magnesium hydroxide Suspension 30 milliLiter(s) Oral two times a day PRN Constipation  nalbuphine Injectable 2.5 milliGRAM(s) IV Push every 6 hours PRN Pruritus  naloxone Injectable 0.1 milliGRAM(s) IV Push every 3 minutes PRN For ANY of the following changes in patient status:  A. Breaths Per Minute LESS THAN 10, B. Oxygen saturation LESS THAN 90%, C. Sedation score of 6 for Stop After: 4 Times  ondansetron Injectable 4 milliGRAM(s) IV Push every 6 hours PRN Nausea  oxyCODONE    IR 5 milliGRAM(s) Oral every 3 hours PRN Moderate to Severe Pain (4-10)  oxyCODONE    IR 5 milliGRAM(s) Oral once PRN Moderate to Severe Pain (4-10)  simethicone 80 milliGRAM(s) Chew every 4 hours PRN Gas      LABS:  Blood type: O Positive  Rubella IgG: RPR: Negative        Physical exam:  Gen: NAD  Abdomen: Soft, nontender, no distension , firm uterine fundus at umbilicus.  Incision: Clean, dry, and intact   Pelvic: Normal lochia noted  Ext: No calf tenderness

## 2022-02-07 NOTE — DISCHARGE NOTE OB - PLAN OF CARE
Make your follow-up appointment with your doctor THIS WEEK for a blood pressure check after discharge. No heavy lifting, driving, or strenuous activity for 6 weeks. Nothing per vagina such as tampons, intercourse, douches, or tub baths for 6 weeks or until you see your doctor. Call your doctor with any signs and symptoms of infection such as fever, chills, nausea, or vomiting. Call your doctor if you're unable to tolerate food, increase in vaginal bleeding, or have difficulty urinating. Call your doctor if you have pain that is not relieved by your prescribed medications. Notify your doctor with any other concerns. Call [phone] if you have any of these concerns in the next 6 weeks. Continue Procardia 90 XL daily. Check your blood pressure 3 times a day. Your goal range is 130-140s/80-90s. If your blood pressure is consistently in the 150s/100s call your doctor to discuss adjusting your medication. If your blood pressure is consistently in the 120s/70s call your doctor to discuss decreasing your dose. If your blood pressure is <160s/110s, if you have severe headache not improved with Tylenol/Motrin, or if you have shortness of breath please, come to the emergency department for further evaluation.  Follow up with our [facility] outpatient for management of your hypertensive disorder during pregnancy. Our team will email the office at [email or web address] to facilitate scheduling your appointment. Call [phone] with any questions about your appointment.

## 2022-02-07 NOTE — PROGRESS NOTE ADULT - PROVIDER SPECIALTY LIST ADULT
Anesthesia
MFJASMINE
OB
MFJASMINE
OB

## 2022-02-07 NOTE — PROGRESS NOTE ADULT - ASSESSMENT
A/P: 28yo POD#4 s/p pCCS complicated by SPEC requiring MG.  Patient is stable and doing well post-operatively.      #SPEC    - s/p Mg (1/19-1/20) (2/3-2/4)   - S/p Procardia 10 (1/24)   - Continue Procardia 90xl   - Blood pressure range as above. Appropriate overnight. No symptoms of SPEC this am.     #PP care   - Continue regular diet.  - Increase ambulation.  - Continue motrin, tylenol, oxycodone PRN for pain control  - Birth control options were again reviewed with the patient. Pt declines contraception at this time. The importance of perfect condom use was emphasized. All questions were answered. Pt verbalized understanding.   - Discharge planning    GENE Goyal MD  PGY-1

## 2022-02-07 NOTE — PROGRESS NOTE ADULT - ATTENDING COMMENTS
Attending Note   POD 2 s/p classical c/section with pre-eclampsia with severe features s/p mg on procardia xl 90   doing well   pain adequately controlled  voiding freely   minimal bleeding   Vital Signs Last 24 Hrs  T(C): 36.8 (05 Feb 2022 10:14), Max: 37.1 (05 Feb 2022 05:56)  T(F): 98.3 (05 Feb 2022 10:14), Max: 98.8 (05 Feb 2022 05:56)  HR: 85 (05 Feb 2022 10:14) (72 - 85)  BP: 90/62 (05 Feb 2022 10:14) (90/62 - 130/87)  BP(mean): --  RR: 17 (05 Feb 2022 10:14) (17 - 18)  SpO2: 99% (05 Feb 2022 10:14) (97% - 100%)  Gen in NAD   Abd soft, fundus firm nontender   Incision c/d/i   Ext: no c/c/e     A/P: POD 2 s/p classical c/section with pre-eclampsia with severe features s/p mg on procardia xl 90 mg   routine postop care  monitor bps   continue procardia xl 90 mg  encourage ambulation     ISABELLE Acharya MD
Attending note   Agree with above   POD 3 s/p classical c/section c/w pre-eclampsia with severe features  pain adequately controlled   voiding freely   incision c/d/i  routine postop care  monitor bps on current dose of procardia     R Patrizia ESTRADA
Patient seen and examined with resident and MFM fellow and agree with above
Associate Chief of L & D ( late entry)    I have met this patient for the first time today.  She was admitted  at 23 weeks having received care at [facility] and presented with HA that had worsed since seeing there [facility] for which she was started on Procardia and ASA for severe range BP's 170/110.  She was admitted here by Dr Byrnes with severe IUGR 355 gms and severe range BP's was seen by [facility] and counseled and then delivered by Dr Haerd 25 with 110z baby due to CAT II and PEC with severe features and marked IUGR    OB Progress Note:  Delivery, POD#4    S: 26yo POD#4 s/p Classical-CS . Patient denies any complaints at this time.    O:   Vital Signs Last 24 Hrs  T(C): 36.7 (2022 09:43), Max: 36.8 (2022 06:06)  T(F): 98.1 (2022 09:43), Max: 98.2 (2022 06:06)  HR: 91 (2022 09:43) (73 - 91)  BP: 122/76 (2022 09:43) (119/85 - 139/97)  RR: 17 (2022 09:43) (16 - 18)  SpO2: 100% (2022 09:43) (99% - 100%)    Labs:  Blood type: O Positive  Rubella IgG: RPR: Negative      PE:    Abdomen: soft, fundus firm  Mildly distended, appropriately tender,  incision c/d/i.  Extremities: No erythema, no pitting edema    A/P: 26yo POD#4 s/p Classical-CS due to CAT III and PEC with severe features marked IUGR    -  Patient is stable for discharge   - Continue Procardia 90 mg XL  - Follow up with [facility]    Flory Cole M.D., M.B.A., M.S.
Patient with periviable PEC with severe features now with increasing BPs.  Readdressed with the patient that considering worsening BP control the disease is likely progressing.  Again reviewed that delivery is part of the cure for PEC and continuing the pregnancy does pose a significant risk to her health with potential for end organ involvement.  Reviewed that although option of conservative management exists, considering IUGR, Doppler abnormalities and worsening BP control unlikely to gain much more time for fetal benefit.  However, if continued expectant management is desires would need to increase BP meds.  Patient verbalized understanding and was appropriately tearful and wanted time to think and discuss her options.
Agree with above.  Patient with PEC with severe features.  Labs, BPs stable this AM without s/s of worsening PEC.  Had a long discussion with the patient about poor prognosis for this IUGR fetus with abnormal Doppler studies.  Reviewed that conservative management in this case poses significant risk to maternal health with risk of deterioration and end organ involvement.  The patient verbalized understanding of above and stated she needs more time to think about her options.
Patient known to me and s/p multiple discussions re: poor fetal prognosis in the setting of periviable GA/IUGR/AEDF and maternal PEC with severe features.  Patient without new symptoms today and stable labs.  Readdressed the concerns that we needed to increase Procardia which likely indicates progression of maternal disease.  Patient and mother verbalized full understanding and desire to continue conservative management at this time.  Readdressed that if severe BP elevations are noted again, lab abnormalities or symptoms of worsening PEC, conservative management should no longer be an option since it would significant increase maternal risk without fetal benefit.  Reviewed that until 24 weeks plan is for no fetal monitoring and after 24 weeks will need to readdress monitoring since the patient verbalized she would likely not want a classical  at that time.  Patient understands that after 24 weeks interruption of pregnancy will not be readily available and if fetus is liveborn, NICU assessment at birth will occur.  Continue very close inpatient observation.
Statement Selected

## 2022-02-07 NOTE — DISCHARGE NOTE OB - MEDICATION SUMMARY - MEDICATIONS TO STOP TAKING
I will STOP taking the medications listed below when I get home from the hospital:    metFORMIN 500 mg oral tablet  -- 1 tab(s) by mouth once a day    aspirin 81 mg oral tablet, chewable  -- 1 tab(s) by mouth once a day

## 2022-02-07 NOTE — DISCHARGE NOTE OB - ADDITIONAL INSTRUCTIONS
Make your follow-up appointment with your doctor THIS WEEK for a blood pressure check after discharge. You will also need a 2 week incision check and 6 week postpartum visit. Follow up in Dr. Ewing's office.

## 2022-02-07 NOTE — DISCHARGE NOTE OB - CARE PROVIDER_API CALL
Sandra Ewing)  Obstetrics and Gynecology  [address], [address]  [place] [address]  Phone: [phone]  Fax: [phone]  Follow Up Time:

## 2022-02-07 NOTE — DISCHARGE NOTE OB - CARE PLAN
1 Principal Discharge DX:	Severe preeclampsia  Assessment and plan of treatment:	Make your follow-up appointment with your doctor THIS WEEK for a blood pressure check after discharge. No heavy lifting, driving, or strenuous activity for 6 weeks. Nothing per vagina such as tampons, intercourse, douches, or tub baths for 6 weeks or until you see your doctor. Call your doctor with any signs and symptoms of infection such as fever, chills, nausea, or vomiting. Call your doctor if you're unable to tolerate food, increase in vaginal bleeding, or have difficulty urinating. Call your doctor if you have pain that is not relieved by your prescribed medications. Notify your doctor with any other concerns. Call [phone] if you have any of these concerns in the next 6 weeks.  Secondary Diagnosis:	Preeclampsia  Assessment and plan of treatment:	Continue Procardia 90 XL daily. Check your blood pressure 3 times a day. Your goal range is 130-140s/80-90s. If your blood pressure is consistently in the 150s/100s call your doctor to discuss adjusting your medication. If your blood pressure is consistently in the 120s/70s call your doctor to discuss decreasing your dose. If your blood pressure is <160s/110s, if you have severe headache not improved with Tylenol/Motrin, or if you have shortness of breath please, come to the emergency department for further evaluation.  Follow up with our [facility] outpatient for management of your hypertensive disorder during pregnancy. Our team will email the office at [email or web address] to facilitate scheduling your appointment. Call [phone] with any questions about your appointment.

## 2022-02-07 NOTE — DISCHARGE NOTE OB - MEDICATION SUMMARY - MEDICATIONS TO TAKE
I will START or STAY ON the medications listed below when I get home from the hospital:    Motrin 600 mg oral tablet  -- 1 tab(s) by mouth every 6 hours, As Needed  -- Indication: For Pain control    Tylenol 500 mg oral tablet  -- 2 tab(s) by mouth every 6 hours, As Needed  -- Indication: For Pain control    oxyCODONE 5 mg oral tablet  -- 1 tab(s) by mouth every 6 hours, As Needed -for severe pain MDD:4 tabs   -- Caution federal law prohibits the transfer of this drug to any person other  than the person for whom it was prescribed.  It is very important that you take or use this exactly as directed.  Do not skip doses or discontinue unless directed by your doctor.  May cause drowsiness or dizziness.  This prescription cannot be refilled.  Using more of this medication than prescribed may cause serious breathing problems.    -- Indication: For Pain control as needed    NIFEdipine 90 mg oral tablet, extended release  -- 1 tab(s) by mouth once a day  -- Indication: For Pre-eclampsia    Prenatal 1 oral capsule  -- 1 tab(s) by mouth once a day  -- Indication: For Postpartum state

## 2022-02-07 NOTE — DISCHARGE NOTE OB - NS OB DC PLAN IMMU TDAP DATE
Topical Retinoid Pregnancy And Lactation Text: This medication is Pregnancy Category C. It is unknown if this medication is excreted in breast milk. 2/7/2022

## 2022-02-08 ENCOUNTER — NON-APPOINTMENT (OUTPATIENT)
Age: 27
End: 2022-02-08

## 2022-02-08 LAB
B2 GLYCOPROT1 AB SER QL: NEGATIVE — SIGNIFICANT CHANGE UP
CARDIOLIPIN AB SER-ACNC: NEGATIVE — SIGNIFICANT CHANGE UP
HSV1 AB FLD QL: NEGATIVE — SIGNIFICANT CHANGE UP
HSV2 AB FLD-ACNC: NEGATIVE — SIGNIFICANT CHANGE UP

## 2022-02-08 RX ORDER — ACETAMINOPHEN 500 MG/1
500 TABLET ORAL
Refills: 0 | Status: ACTIVE | COMMUNITY
Start: 2022-02-08

## 2022-02-08 RX ORDER — OXYCODONE 5 MG/1
5 TABLET ORAL
Refills: 0 | Status: ACTIVE | COMMUNITY
Start: 2022-02-08

## 2022-02-08 RX ORDER — NIFEDIPINE 90 MG/1
90 TABLET, EXTENDED RELEASE ORAL DAILY
Refills: 0 | Status: ACTIVE | COMMUNITY
Start: 2022-02-08

## 2022-02-08 RX ORDER — IBUPROFEN 600 MG/1
600 TABLET, FILM COATED ORAL EVERY 6 HOURS
Refills: 0 | Status: ACTIVE | COMMUNITY
Start: 2022-02-08

## 2022-02-10 ENCOUNTER — NON-APPOINTMENT (OUTPATIENT)
Age: 27
End: 2022-02-10

## 2022-02-13 ENCOUNTER — NON-APPOINTMENT (OUTPATIENT)
Age: 27
End: 2022-02-13

## 2022-02-13 ENCOUNTER — EMERGENCY (EMERGENCY)
Facility: HOSPITAL | Age: 27
LOS: 1 days | Discharge: HOME | End: 2022-02-13
Attending: EMERGENCY MEDICINE | Admitting: STUDENT IN AN ORGANIZED HEALTH CARE EDUCATION/TRAINING PROGRAM
Payer: MEDICAID

## 2022-02-13 VITALS
DIASTOLIC BLOOD PRESSURE: 69 MMHG | SYSTOLIC BLOOD PRESSURE: 124 MMHG | OXYGEN SATURATION: 100 % | TEMPERATURE: 97 F | HEIGHT: 60 IN | RESPIRATION RATE: 16 BRPM | HEART RATE: 154 BPM

## 2022-02-13 PROCEDURE — 93010 ELECTROCARDIOGRAM REPORT: CPT

## 2022-02-13 PROCEDURE — 99234 HOSP IP/OBS SM DT SF/LOW 45: CPT

## 2022-02-13 PROCEDURE — 99291 CRITICAL CARE FIRST HOUR: CPT | Mod: 25

## 2022-02-13 NOTE — ED ADULT NURSE NOTE - OBJECTIVE STATEMENT
Break RN note- patient arrives to the ED for worsening palpitations for the past week.  A&Ox4. Patient complaining of slight headache and dizziness. Patient reports she feels like she is "bleeding from her chest". Break RN note- patient arrives to the ED for worsening palpitations for the past week.  A&Ox4. Patient complaining of slight headache and dizziness. Patient reports she feels like she is "bleeding from her chest". Denies any, nausea, vomiting, or fevers. Patient reports she is 1 week post partum where she was started on procardia for her preeclampsia. . Patient reports she delivered her baby at 25 weeks. Belly soft, nondistended, nontender.  incision CDI, without s/sx of infection. Patient breathing even and nonlabored. Cardiac monitor in place- sinus tach. Dr. Chavarria at bedside for evaluation. 20g Iv placed to right arm. Labs sent as ordered. Safety maintained. Patient stable upon exiting the room.

## 2022-02-13 NOTE — ED ADULT TRIAGE NOTE - CHIEF COMPLAINT QUOTE
postpartum 1 week with palpitations worse today since 1pm. Denies CP, SOB. Slight dizziness and headache. Recently hospitalized for preeclampsia.  postpartum 1 week with palpitations worse today since 1pm. Denies CP, SOB. Slight dizziness and headache. Recently hospitalized for preeclampsia and son is in the NICU pre-term 25 weeks.

## 2022-02-13 NOTE — ED ADULT NURSE NOTE - CHIEF COMPLAINT QUOTE
postpartum 1 week with palpitations worse today since 1pm. Denies CP, SOB. Slight dizziness and headache. Recently hospitalized for preeclampsia and son is in the NICU pre-term 25 weeks.

## 2022-02-14 ENCOUNTER — NON-APPOINTMENT (OUTPATIENT)
Age: 27
End: 2022-02-14

## 2022-02-14 VITALS
DIASTOLIC BLOOD PRESSURE: 75 MMHG | HEART RATE: 88 BPM | TEMPERATURE: 98 F | SYSTOLIC BLOOD PRESSURE: 119 MMHG | OXYGEN SATURATION: 100 % | RESPIRATION RATE: 17 BRPM

## 2022-02-14 LAB
ALBUMIN SERPL ELPH-MCNC: 4.3 G/DL — SIGNIFICANT CHANGE UP (ref 3.3–5)
ALP SERPL-CCNC: 128 U/L — HIGH (ref 40–120)
ALT FLD-CCNC: 30 U/L — SIGNIFICANT CHANGE UP (ref 4–33)
ANION GAP SERPL CALC-SCNC: 15 MMOL/L — HIGH (ref 7–14)
AST SERPL-CCNC: 29 U/L — SIGNIFICANT CHANGE UP (ref 4–32)
BASOPHILS # BLD AUTO: 0.08 K/UL — SIGNIFICANT CHANGE UP (ref 0–0.2)
BASOPHILS NFR BLD AUTO: 0.6 % — SIGNIFICANT CHANGE UP (ref 0–2)
BILIRUB SERPL-MCNC: 0.3 MG/DL — SIGNIFICANT CHANGE UP (ref 0.2–1.2)
BUN SERPL-MCNC: 18 MG/DL — SIGNIFICANT CHANGE UP (ref 7–23)
CALCIUM SERPL-MCNC: 9.5 MG/DL — SIGNIFICANT CHANGE UP (ref 8.4–10.5)
CHLORIDE SERPL-SCNC: 99 MMOL/L — SIGNIFICANT CHANGE UP (ref 98–107)
CO2 SERPL-SCNC: 23 MMOL/L — SIGNIFICANT CHANGE UP (ref 22–31)
CREAT SERPL-MCNC: 0.75 MG/DL — SIGNIFICANT CHANGE UP (ref 0.5–1.3)
D DIMER BLD IA.RAPID-MCNC: 460 NG/ML DDU — HIGH
EOSINOPHIL # BLD AUTO: 0.23 K/UL — SIGNIFICANT CHANGE UP (ref 0–0.5)
EOSINOPHIL NFR BLD AUTO: 1.7 % — SIGNIFICANT CHANGE UP (ref 0–6)
FLUAV AG NPH QL: SIGNIFICANT CHANGE UP
FLUBV AG NPH QL: SIGNIFICANT CHANGE UP
GLUCOSE SERPL-MCNC: 165 MG/DL — HIGH (ref 70–99)
HCT VFR BLD CALC: 41.1 % — SIGNIFICANT CHANGE UP (ref 34.5–45)
HGB BLD-MCNC: 13.8 G/DL — SIGNIFICANT CHANGE UP (ref 11.5–15.5)
IANC: 9.02 K/UL — HIGH (ref 1.5–8.5)
IMM GRANULOCYTES NFR BLD AUTO: 0.4 % — SIGNIFICANT CHANGE UP (ref 0–1.5)
LYMPHOCYTES # BLD AUTO: 25.9 % — SIGNIFICANT CHANGE UP (ref 13–44)
LYMPHOCYTES # BLD AUTO: 3.6 K/UL — HIGH (ref 1–3.3)
MAGNESIUM SERPL-MCNC: 1.7 MG/DL — SIGNIFICANT CHANGE UP (ref 1.6–2.6)
MCHC RBC-ENTMCNC: 30.1 PG — SIGNIFICANT CHANGE UP (ref 27–34)
MCHC RBC-ENTMCNC: 33.6 GM/DL — SIGNIFICANT CHANGE UP (ref 32–36)
MCV RBC AUTO: 89.7 FL — SIGNIFICANT CHANGE UP (ref 80–100)
MONOCYTES # BLD AUTO: 0.93 K/UL — HIGH (ref 0–0.9)
MONOCYTES NFR BLD AUTO: 6.7 % — SIGNIFICANT CHANGE UP (ref 2–14)
NEUTROPHILS # BLD AUTO: 9.02 K/UL — HIGH (ref 1.8–7.4)
NEUTROPHILS NFR BLD AUTO: 64.7 % — SIGNIFICANT CHANGE UP (ref 43–77)
NRBC # BLD: 0 /100 WBCS — SIGNIFICANT CHANGE UP
NRBC # FLD: 0 K/UL — SIGNIFICANT CHANGE UP
PLATELET # BLD AUTO: 358 K/UL — SIGNIFICANT CHANGE UP (ref 150–400)
POTASSIUM SERPL-MCNC: 4.5 MMOL/L — SIGNIFICANT CHANGE UP (ref 3.5–5.3)
POTASSIUM SERPL-SCNC: 4.5 MMOL/L — SIGNIFICANT CHANGE UP (ref 3.5–5.3)
PROT SERPL-MCNC: 7.5 G/DL — SIGNIFICANT CHANGE UP (ref 6–8.3)
RBC # BLD: 4.58 M/UL — SIGNIFICANT CHANGE UP (ref 3.8–5.2)
RBC # FLD: 13 % — SIGNIFICANT CHANGE UP (ref 10.3–14.5)
RSV RNA NPH QL NAA+NON-PROBE: SIGNIFICANT CHANGE UP
SARS-COV-2 RNA SPEC QL NAA+PROBE: SIGNIFICANT CHANGE UP
SODIUM SERPL-SCNC: 137 MMOL/L — SIGNIFICANT CHANGE UP (ref 135–145)
T3 SERPL-MCNC: 116 NG/DL — SIGNIFICANT CHANGE UP (ref 80–200)
T4 AB SER-ACNC: 9.45 UG/DL — SIGNIFICANT CHANGE UP (ref 5.1–13)
TSH SERPL-MCNC: 1.79 UIU/ML — SIGNIFICANT CHANGE UP (ref 0.27–4.2)
WBC # BLD: 13.91 K/UL — HIGH (ref 3.8–10.5)
WBC # FLD AUTO: 13.91 K/UL — HIGH (ref 3.8–10.5)

## 2022-02-14 PROCEDURE — 93306 TTE W/DOPPLER COMPLETE: CPT | Mod: 26

## 2022-02-14 PROCEDURE — 71046 X-RAY EXAM CHEST 2 VIEWS: CPT | Mod: 26

## 2022-02-14 PROCEDURE — 99284 EMERGENCY DEPT VISIT MOD MDM: CPT

## 2022-02-14 PROCEDURE — 71275 CT ANGIOGRAPHY CHEST: CPT | Mod: 26,MA

## 2022-02-14 RX ORDER — SODIUM CHLORIDE 9 MG/ML
1000 INJECTION, SOLUTION INTRAVENOUS ONCE
Refills: 0 | Status: COMPLETED | OUTPATIENT
Start: 2022-02-14 | End: 2022-02-14

## 2022-02-14 RX ORDER — ACETAMINOPHEN 500 MG
975 TABLET ORAL ONCE
Refills: 0 | Status: COMPLETED | OUTPATIENT
Start: 2022-02-14 | End: 2022-02-14

## 2022-02-14 RX ORDER — LABETALOL HCL 100 MG
1 TABLET ORAL
Qty: 60 | Refills: 0
Start: 2022-02-14 | End: 2022-03-15

## 2022-02-14 RX ORDER — NIFEDIPINE 30 MG
1 TABLET, EXTENDED RELEASE 24 HR ORAL
Qty: 30 | Refills: 0
Start: 2022-02-14 | End: 2022-03-15

## 2022-02-14 RX ORDER — SODIUM CHLORIDE 9 MG/ML
1000 INJECTION INTRAMUSCULAR; INTRAVENOUS; SUBCUTANEOUS
Refills: 0 | Status: DISCONTINUED | OUTPATIENT
Start: 2022-02-14 | End: 2022-02-17

## 2022-02-14 RX ADMIN — Medication 975 MILLIGRAM(S): at 06:10

## 2022-02-14 RX ADMIN — SODIUM CHLORIDE 1000 MILLILITER(S): 9 INJECTION, SOLUTION INTRAVENOUS at 00:29

## 2022-02-14 RX ADMIN — SODIUM CHLORIDE 125 MILLILITER(S): 9 INJECTION INTRAMUSCULAR; INTRAVENOUS; SUBCUTANEOUS at 07:26

## 2022-02-14 NOTE — ED CDU PROVIDER INITIAL DAY NOTE - ATTENDING CONTRIBUTION TO CARE
26 yo f  1 week PP s/p delivery @ 25week from pre-E presenting for palpitation. Feeling intermittent palpitation since sometime after nifedipine was initiated. palpitations worse today. In ED tachy, sinus tachy to 150s. HR now improving after IVF. labs, cta without significant abnormality to explain symptoms. cardiology consulted. exam as above. will obs in cdu, echo, cards, re-eval.

## 2022-02-14 NOTE — ED CDU PROVIDER DISPOSITION NOTE - CLINICAL COURSE
28 y/o female no pmh, 1 week postpartum w/ palpitations. Pt found to have sinus tachycardia in the ER w/ normal labs and a negative CTA chest. Pt was sent to the CDu for tele, echo and cardiology consult. pt felt better after CDU stay with no acute events overnight. Echo was negative for acute pathology. Cards rec to decrease nifedipine dose to 60mg and to start labetalol 100mg and to f/u as an outpatient. stable for dc.

## 2022-02-14 NOTE — ED CDU PROVIDER DISPOSITION NOTE - ATTENDING CONTRIBUTION TO CARE
I have personally performed a face to face medical and diagnostic evaluation of the patient. I have discussed with and reviewed the ACP's note and agree with the History, ROS, Physical Exam and MDM unless otherwise indicated. A brief summary of my personal evaluation and impression can be found below.    Pt is a 27 y.o female  currently 1 week postpartum, delivered at 25 weeks 2/2 pre-eclampsia (on nifedipine) who presents to ED c/o palpitations x 1 day. Pt states she delivered 1 week ago, her son is in NICU and notes she left NICU around 8 pm and states shortly afterwards she began to experience palpitations that felt like a throbbing causing her to come to ED. Admits she was admitted to hospital 3 weeks prior to delivery for pre-eclampsia and was started on nifedipine and notes she did experience intermittent palpitations prior to delivery as well. Pt had appt to see Dr. Alonzo  but given palpitations came to ED. Pt denies fevers, chills, cp, pleuritic pain, fhx of cad, hx of smoking, abd pain, n/v/d, dizziness, weakness, heavy vaginal bleeding, breast pain/infection or any other complaints.  Pt seen and worked up in ED; Labs WNL. CXR normal. d-dimer elevated. CTA normal. Pt had one episode of sinus tachycardia at 150's. Pt seen by cards. Pt transferred to CDU for tele and echo    No acute events overnight, pt feeling well this morning. Tolerating PO without issue. No palpitations.    All other ROS negative, except as above and as per HPI and ROS section.    VITALS: Initial triage and subsequent vitals have been reviewed by me.  GEN APPEARANCE: WDWN, alert, non-toxic, NAD  HEAD: Atraumatic.  EYES: PERRLa, EOMI, vision grossly intact.   NECK: Supple  CV: RRR, S1S2, no c/r/m/g. Cap refill <2 seconds. No bruits.   LUNGS: CTAB. No abnormal breath sounds.  ABDOMEN: Soft, NTND. No guarding or rebound.   MSK/EXT: No spinal or extremity point tenderness. No CVA ttp. Pelvis stable. No peripheral edema.  NEURO: Alert, follows commands. Weight bearing normal. Speech normal. Sensation and motor normal x4 extremities.   SKIN: Warm, dry and intact. No rash.  PSYCH: Appropriate      Plan/MDM: Pt is a 27 y.o female  currently 1 week postpartum, delivered at 25 weeks 2/2 pre-eclampsia (on nifedipine) who presents to ED c/o palpitations x 1 day. Pt states she delivered 1 week ago, her son is in NICU and notes she left NICU around 8 pm and states shortly afterwards she began to experience palpitations that felt like a throbbing causing her to come to ED. Admits she was admitted to hospital 3 weeks prior to delivery for pre-eclampsia and was started on nifedipine and notes she did experience intermittent palpitations prior to delivery as well. Pt had appt to see Dr. Alonzo  but given palpitations came to ED. Pt denies fevers, chills, cp, pleuritic pain, fhx of cad, hx of smoking, abd pain, n/v/d, dizziness, weakness, heavy vaginal bleeding, breast pain/infection or any other complaints.  Pt seen and worked up in ED; Labs WNL. CXR normal. d-dimer elevated. CTA normal. Pt had one episode of sinus tachycardia at 150's. Pt seen by cards. Pt transferred to CDU for tele and echo exam vss non toxic PE as above ddx c/f palpitations in setting of recent preg, cta neg, labs non actionable pending echo this morning, should echo be non actionable and subsequently cleared by cards, anticipated dc this pm.

## 2022-02-14 NOTE — ED PROVIDER NOTE - OBJECTIVE STATEMENT
28 y/o F with no reported PMH,  (early delivery @ 25 weeks 1 week ago) presenting with palpitations. Patient's pregnancy complicated by pre-eclampsia on nifedipine. Patient reports intermittent episodes of palpitations during last few weeks of pregnancy as well, scheduled for cards f/u on 3/1. Patient denies any CP, SOB, n/v, abd pain, LE swelling, lightheadedness. No LOC 26 y/o F with no reported PMH,  (early delivery @ 25 weeks 1 week ago) presenting with palpitations. Patient's pregnancy complicated by pre-eclampsia on nifedipine. Patient reports intermittent episodes of palpitations during last few weeks of pregnancy as well, scheduled for cards f/u on 3/1. Patient denies any CP, SOB, n/v, abd pain, LE swelling, lightheadedness. No LOC    Attendinyo female presents with palpitations.  has been having these since starting on carvidelol.  no fever.  no shortness of breath.  no fever.  feels fine but just has the palpitations.
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
## 2022-02-14 NOTE — ED PROVIDER NOTE - CLINICAL SUMMARY MEDICAL DECISION MAKING FREE TEXT BOX
DO Shaina PGY-3: 26 y/o F presenting with palpitations, tachy to 150s. EKG sinus. Will eval for anemia, electrolyte derangements, thyroid abnormality. Pt without chest pain, SOB unlikely PE. Pt likely TB for tele monitoring and cards eval

## 2022-02-14 NOTE — ED PROVIDER NOTE - PHYSICAL EXAMINATION
gen: well appearing  Mentation: AAO x 3  psych: mood appropriate  ENT: airway patent  Eyes: conjunctivae clear bilaterally  Cardio: tachy, no m/r/g  Resp: normal BS b/l  GI: s/nt/nd  : no CVA tenderness  Neuro: sensation and motor function intact  Skin:  incision c/d/i  MSK: normal movement of all extremities  Lymph/Vasc: no LE edema

## 2022-02-14 NOTE — ED CDU PROVIDER DISPOSITION NOTE - PATIENT PORTAL LINK FT
You can access the FollowMyHealth Patient Portal offered by [facility] by registering at the following website: [email or web address]. By joining [facility]’s FollowMyHealth portal, you will also be able to view your health information using other applications (apps) compatible with our system.

## 2022-02-14 NOTE — ED CDU PROVIDER INITIAL DAY NOTE - PROGRESS NOTE DETAILS
pt feeling better after CDU stay, echo neg for acute pathology. Pt to f/u with cardiology as outpatient. stable for dc.

## 2022-02-14 NOTE — CONSULT NOTE ADULT - ATTENDING COMMENTS
sinus tachycardia and palpitations, worse since increasing nifedipine to 90mg daily for PEC  also under a significant amount of stress as her child is in the NICU    No orthopnea or LE edema    Check TTE to evaluate for structural heart disease  If the TTE is without significant findings, possible that there is also a correlation with a CCB and her symptoms and would change her nifedipine to 60mg daily and add labetalol 100mg BID. she can be discharged from a cardiac standpoint on this regimen and can follow-up with her OB as an outpt.    discussed plan and potential side effects of labetalol with pt.

## 2022-02-14 NOTE — CONSULT NOTE ADULT - SUBJECTIVE AND OBJECTIVE BOX
HPI:  28yo F w/ no reported PMHx,  (early delivery @ 25 weeks 1 week ago) presenting with palpitations. Patient's pregnancy complicated by pre-eclampsia started on nifedipine in january. Patient reports intermittent episodes of palpitations during last few weeks of pregnancy as well, scheduled for outpatient cardiology appointment on 3/1/22. denies any chest pain, SOB, cough, fevers, dizziness, syncope, abd pain, back pain, N/V/D, recent sick contact, recent travel.  In ED HR sinus tach up to 150bpm, currently 115-120bpm w/o intervention.    No Known Allergies    PAST MEDICAL & SURGICAL HISTORY:  Migraine  History of PCOS    SUBSTANCE USE  Tobacco Usage: denies    Alcohol Usage: denies  Recreational drugs: denies    REVIEW OF SYSTEMS:  CONSTITUTIONAL: No fevers, No chills, No fatigue, No weight gain  RESPIRATORY: No shortness of breath, No cough, No wheezing, No hemoptysis  CARDIOVASCULAR: No chest pain. + palpitations, No pleuritic pain  GASTROINTESTINAL: No abdominal pain, No nausea, No vomiting, No hematemesis, No diarrhea No constipation. No melena  GENITOURINARY: No dysuria, No frequency, No incontinence, No hematuria  NEUROLOGICAL: No dizziness, No lightheadedness, No syncope, No LOC, No headache, No numbness or weakness  EXTREMITIES: No Edema, No joint pain, No joint swelling.  SKIN: No diaphoresis. No itching, No rashes, No pressure ulcers  All other review of systems is negative unless indicated above.    T(C): 36.2 (22 @ 23:25), Max: 36.2 (22 @ 23:25)  HR: 138 (22 @ 01:04) (138 - 154)  BP: 127/71 (22 @ 01:04) (124/69 - 127/71)  RR: 18 (22 @ 01:04) (16 - 18)  SpO2: 100% (22 @ 01:04) (100% - 100%)    Physical Exam:  General: NAD  Cardiovascular: Normal S1 S2, No JVD, No murmurs, No edema  Respiratory: Lungs clear to auscultation	  Gastrointestinal:  Soft, Non-tender, + BS	  Skin: warm and dry, No rashes, No ecchymoses, No cyanosis	  Extremities:  No clubbing, cyanosis or edema  Vascular: Peripheral pulses palpable 2+ bilaterally    CBC Full  -  ( 2022 00:37 )  WBC Count : 13.91 K/uL  Hemoglobin : 13.8 g/dL  Hematocrit : 41.1 %  Platelet Count - Automated : 358 K/uL  Mean Cell Volume : 89.7 fL  Mean Cell Hemoglobin : 30.1 pg  Mean Cell Hemoglobin Concentration : 33.6 gm/dL  Auto Neutrophil # : 9.02 K/uL  Auto Lymphocyte # : 3.60 K/uL  Auto Monocyte # : 0.93 K/uL  Auto Eosinophil # : 0.23 K/uL  Auto Basophil # : 0.08 K/uL  Auto Neutrophil % : 64.7 %  Auto Lymphocyte % : 25.9 %  Auto Monocyte % : 6.7 %  Auto Eosinophil % : 1.7 %  Auto Basophil % : 0.6 %        137  |  99  |  18  ----------------------------<  165<H>  4.5   |  23  |  0.75    Ca    9.5      2022 00:37  Mg     1.70         TPro  7.5  /  Alb  4.3  /  TBili  0.3  /  DBili  x   /  AST  29  /  ALT  30  /  AlkPhos  128<H>    TSH: Thyroid Stimulating Hormone, Serum: 1.79 uIU/mL ( @ 00:37)

## 2022-02-14 NOTE — CONSULT NOTE ADULT - ASSESSMENT
28yo F w/ no reported PMHx,  (early delivery @ 25 weeks 1 week ago) presenting with palpitations. Patient's pregnancy complicated by pre-eclampsia started on nifedipine in january. Patient reports intermittent episodes of palpitations during last few weeks of pregnancy as well, scheduled for outpatient cardiology appointment on 3/1/22. denies any chest pain, SOB, cough, fevers, dizziness, syncope, abd pain, back pain, N/V/D, recent sick contact, recent travel.  In ED, HR sinus tach up to 150bpm, currently 115-120bpm w/o intervention.    # Palpitations  # Sinus tachycardia  - Pt asymptomatic. Suspect related to increased sympathetic tone from pregnancy vs. possible adverse reaction to nifedipine   - Check D-dimer, if positive CTA chest to r/o PE  - TSH normal  - Continue with tele monitoring.   - TTE in am to assess for LV function and valvular disease    Thank you, if any questions or clinical situation changes please call [facility] #[phone].  Attending Attestation to follow 28yo F w/ no reported PMHx,  (early delivery @ 25 weeks 1 week ago) presenting with palpitations. Patient's pregnancy complicated by pre-eclampsia started on nifedipine in january. Patient reports intermittent episodes of palpitations during last few weeks of pregnancy as well, scheduled for outpatient cardiology appointment on 3/1/22. denies any chest pain, SOB, cough, fevers, dizziness, syncope, abd pain, back pain, N/V/D, recent sick contact, recent travel.  In ED, HR sinus tach up to 150bpm, currently 115-120bpm w/o intervention.    # Post partum palpitations  # Sinus tachycardia  - Pt asymptomatic. Suspect related to increased sympathetic tone from pregnancy vs. possible adverse reaction to nifedipine   - Check D-dimer, if positive CTA chest to r/o PE  - TSH normal  - Continue with tele monitoring.   - TTE in am to assess for LV function and valvular disease    Thank you, if any questions or clinical situation changes please call [facility] #[phone].  Attending Attestation to follow

## 2022-02-14 NOTE — ED CDU PROVIDER INITIAL DAY NOTE - OBJECTIVE STATEMENT
Pt is a 27 y.o female  currently 1 week postpartum, delivered at 25 weeks 2/2 pre-eclampsia (on nifedipine) who presents to ED c/o palpitations x 1 day. Pt states she delivered 1 week ago, her son is in NICU and notes she left NICU around 8 pm and states shortly afterwards she began to experience palpitations that felt like a throbbing causing her to come to ED. Admits she was admitted to hospital 3 weeks prior to delivery for pre-eclampsia and was started on nifedipine and notes she did experience intermittent palpitations prior to delivery as well. Pt had appt to see Dr. Alonzo  but given palpitations came to ED. Pt denies fevers, chills, cp, pleuritic pain, fhx of cad, hx of smoking, abd pain, n/v/d, dizziness, weakness, heavy vaginal bleeding, breast pain/infection or any other complaints.  Pt seen and worked up in ED; Labs WNL. CXR normal. d-dimer elevated. CTA normal. Pt had one episode of sinus tachycardia at 150's. Pt seen by cards. Pt transferred to CDU for tele and echo.

## 2022-02-14 NOTE — ED CDU PROVIDER INITIAL DAY NOTE - MEDICAL DECISION MAKING DETAILS
Pt is a 27 y.o female  currently 1 week postpartum, delivered at 25 weeks 2/2 pre-eclampsia (on nifedipine) who presents to ED c/o palpitations x 1 day. Pt seen and worked up in ED; Labs WNL. CXR normal. d-dimer elevated. CTA normal. Pt had one episode of sinus tachycardia at 150's. Pt seen by cards. Pt transferred to CDU for tele and echo.

## 2022-02-15 ENCOUNTER — APPOINTMENT (OUTPATIENT)
Dept: CARE COORDINATION | Facility: HOME HEALTH | Age: 27
End: 2022-02-15
Payer: MEDICAID

## 2022-02-15 VITALS — DIASTOLIC BLOOD PRESSURE: 69 MMHG | SYSTOLIC BLOOD PRESSURE: 111 MMHG | HEART RATE: 92 BPM | TEMPERATURE: 97.7 F

## 2022-02-15 PROCEDURE — 96127 BRIEF EMOTIONAL/BEHAV ASSMT: CPT

## 2022-02-15 PROCEDURE — 99350 HOME/RES VST EST HIGH MDM 60: CPT

## 2022-02-15 RX ORDER — LABETALOL HYDROCHLORIDE 100 MG/1
100 TABLET, FILM COATED ORAL
Refills: 0 | Status: ACTIVE | COMMUNITY
Start: 2022-02-15

## 2022-02-22 ENCOUNTER — NON-APPOINTMENT (OUTPATIENT)
Age: 27
End: 2022-02-22

## 2022-02-28 ENCOUNTER — NON-APPOINTMENT (OUTPATIENT)
Age: 27
End: 2022-02-28

## 2022-03-01 ENCOUNTER — NON-APPOINTMENT (OUTPATIENT)
Age: 27
End: 2022-03-01

## 2022-03-01 ENCOUNTER — APPOINTMENT (OUTPATIENT)
Dept: CARDIOLOGY | Facility: HOSPITAL | Age: 27
End: 2022-03-01

## 2022-03-01 VITALS
OXYGEN SATURATION: 100 % | HEART RATE: 87 BPM | TEMPERATURE: 97.4 F | BODY MASS INDEX: 31.97 KG/M2 | DIASTOLIC BLOOD PRESSURE: 82 MMHG | SYSTOLIC BLOOD PRESSURE: 119 MMHG | RESPIRATION RATE: 16 BRPM | WEIGHT: 161 LBS

## 2022-03-01 DIAGNOSIS — O14.90 UNSPECIFIED PRE-ECLAMPSIA, UNSPECIFIED TRIMESTER: ICD-10-CM

## 2022-03-01 DIAGNOSIS — R00.2 PALPITATIONS: ICD-10-CM

## 2022-03-01 NOTE — HISTORY OF PRESENT ILLNESS
[FreeTextEntry1] : Ms. Amador is a 27F w/  (early delivery @ 25 weeks) c/b pre-eclampsia, who presented to the clinic to establish care.\par \par Her pregnancy was complicated with gHTN and pre-eclampsia, and patient was taking nicardipine with good BP control. Patient had episodes of palpitations afterwards during her pregnancy, which even continued after her delivery. The palpitation was intermittent episodes without clear trigger, yet episodes became more frequent and worsened after nicardipine upttiration. episodes usually spontaneously resolved. Patient visited ED in 2022, where she was monitored overnight in CDU. Tele showed sinus tachycardia going upto . CTA chest was negative for PE and TTE was normal. Patient was discharged after reducing nicardipine to 60mg daily from 90mg and started on labetalol 100mg BID. Since discharge, patient has been subjectively doing well. She denies chest pain, SOB/HUTCHINS, cough, fever chills, rigors, lightheadedness, syncope. She had transient leg edema bilaterally after delivery, yet now resolved.\par \par She has been checking her BP at home daily and BP ranges from 110 to 130s. She has been holding nicardipine from time to time when BP seems low.\par She is tolerating PO well. Trying to eat low sodium diet as well.\par \par \par

## 2022-03-01 NOTE — ASSESSMENT
[FreeTextEntry1] : Ms. Amador is a 27F w/  (early delivery @ 25 weeks) c/b pre-eclampsia, who presented to the clinic to establish care.\par \par # Pre-eclampsia/gHTN\par - patient is on nicardipine 60mg daily and labetalol 100mg BID, yet has been inconsistently taking the medications after checking her BP dialy.\par - most of BP ranges between 110- 130s.\par - Will try nicardipine 30mg daily + labetalol 100mg BID with holding parameter for now.\par - Encouraged continuing lifestyle modification including checking BP daily and low Na diet, exercise.\par - Continue to monitor BP at home\par - RTC in 1 month.\par \par # Palpitation\par - EKG today shows NSR\par - TTE and CTA unremarkable in 2022.\par - tele during the hospital CDU stay showed sinus tach per documentation.\par - could be compensatory tachycardia resulting from vasodilatin of nicardipine. will continue to titrate off nicardipine.\par - patient denies having recurrent palpitation.\par \par discussed with Dr. Hurtado

## 2022-03-01 NOTE — CARDIOLOGY SUMMARY
[de-identified] : TTE 2/14/2022\par 1. Normal mitral valve. Minimal mitral regurgitation.\par 2. Normal left ventricular internal dimensions and wall\par thicknesses.\par 3. Normal left ventricular systolic function. No segmental\par wall motion abnormalities.\par 4. Normal left ventricular diastolic function.\par 5. Normal right ventricular size and function. [de-identified] : CTA Chest (02/14/2022)\par \par LUNGS AND AIRWAYS: Patent central airways.  Lungs are clear.\par PLEURA: No pleural effusion.\par MEDIASTINUM AND JOCELINE: No lymphadenopathy.\par VESSELS: No pulmonary embolism.\par HEART: Heart size is normal. No pericardial effusion.\par CHEST WALL AND LOWER NECK: Mildly prominent bilateral axillary lymph \par nodes with representative left axillary lymph node measuring 1.2 x 1.1 cm \par on image 24 series 2.\par VISUALIZED UPPER ABDOMEN: Contracted gallbladder. Partially imaged mildly \par prominent left renal pelvis.\par BONES: Within normal limits.\par \par IMPRESSION:\par No pulmonary embolism.\par No consolidation or pleural effusion.\par

## 2022-03-07 ENCOUNTER — NON-APPOINTMENT (OUTPATIENT)
Age: 27
End: 2022-03-07

## 2022-04-28 LAB — SURGICAL PATHOLOGY STUDY: SIGNIFICANT CHANGE UP

## 2022-05-17 ENCOUNTER — APPOINTMENT (OUTPATIENT)
Dept: CARDIOLOGY | Facility: HOSPITAL | Age: 27
End: 2022-05-17

## 2022-05-17 VITALS — BODY MASS INDEX: 33.17 KG/M2 | WEIGHT: 167 LBS

## 2022-05-17 VITALS
SYSTOLIC BLOOD PRESSURE: 134 MMHG | TEMPERATURE: 98.4 F | HEART RATE: 101 BPM | HEIGHT: 59.5 IN | DIASTOLIC BLOOD PRESSURE: 92 MMHG | BODY MASS INDEX: 33.17 KG/M2 | OXYGEN SATURATION: 97 %

## 2022-05-17 DIAGNOSIS — O13.9 GESTATIONAL [PREGNANCY-INDUCED] HYPERTENSION W/OUT SIGNIFICANT PROTEINURIA, UNSPECIFIED TRIMESTER: ICD-10-CM

## 2022-05-17 NOTE — CARDIOLOGY SUMMARY
[de-identified] : TTE 2/14/2022\par 1. Normal mitral valve. Minimal mitral regurgitation.\par 2. Normal left ventricular internal dimensions and wall\par thicknesses.\par 3. Normal left ventricular systolic function. No segmental\par wall motion abnormalities.\par 4. Normal left ventricular diastolic function.\par 5. Normal right ventricular size and function.  [de-identified] : CTA Chest (02/14/2022)\par \par LUNGS AND AIRWAYS: Patent central airways. Lungs are clear.\par PLEURA: No pleural effusion.\par MEDIASTINUM AND JOCELINE: No lymphadenopathy.\par VESSELS: No pulmonary embolism.\par HEART: Heart size is normal. No pericardial effusion.\par CHEST WALL AND LOWER NECK: Mildly prominent bilateral axillary lymph \par nodes with representative left axillary lymph node measuring 1.2 x 1.1 cm \par on image 24 series 2.\par VISUALIZED UPPER ABDOMEN: Contracted gallbladder. Partially imaged mildly \par prominent left renal pelvis.\par BONES: Within normal limits.\par \par IMPRESSION:\par No pulmonary embolism.\par No consolidation or pleural effusion.

## 2022-05-17 NOTE — ASSESSMENT
[FreeTextEntry1] : Ms. Amador is a 27F w/  (early delivery @ 25 weeks) c/b pre-eclampsia, who presented to the clinic to establish care.\par \par # Pre-eclampsia/gHTN\par - patient was on nicardipine 30mg daily and labetalol 100mg BID, yet self-discontinued meds 2.5 weeks ago due to ongoing palpitation after taking meds. She report both meds were associated with her symptoms and palpitation stopped after she stop taking the meds.\par - She measures her BP at home daily: SBP in 120s. DBP in 80s.\par - After risk and benefit discussion, shared decision making was made to monitor patient's BP off antihypertensives. She will check her BP everyday and will call clinic if her BP goes high (SBP > 130 or DBP > 90) to discuss resuming her BP meds. \par - Encouraged continuing lifestyle modification including checking BP daily and low Na diet, exercise.\par - RTC in 4 month or earlier as needed.\par \par # Palpitation\par - EKG last visit showed NSR\par - TTE and CTA unremarkable in 2022.\par - tele during the hospital CDU stay showed sinus tach per documentation.\par - could be compensatory tachycardia resulting from vasodilatin of nicardipine. \par - patient denies having recurrent palpitation after stop taking the antihypertensives.\par \par \par \par \par

## 2022-05-17 NOTE — REASON FOR VISIT
[FreeTextEntry1] : Ms. Amador is a 27F w/  (early delivery @ 25 weeks) c/b pre-eclampsia, who presented to the clinic for follow up.\par \par Her pregnancy was complicated with gHTN and pre-eclampsia, and patient was taking nicardipine with good BP control. Patient had episodes of palpitations afterwards during her pregnancy, which even continued after her delivery. The palpitation was intermittent episodes without clear trigger, yet episodes became more frequent and worsened after nicardipine upttiration. episodes usually spontaneously resolved. Patient visited ED in 2022, where she was monitored overnight in CDU. Tele showed sinus tachycardia going upto . CTA chest was negative for PE and TTE was normal. Patient was discharged after reducing nicardipine to 60mg daily from 90mg and started on labetalol 100mg BID. She had cardiology clinic visit on 3/1/22 found to be normotensive with good BP measures at home. Nicardipine was further decreased to 30mg daily with labetalol 100mg BID.\par \par Since last visit, patient has been subjectively doing well. She denies chest pain, SOB/HUTCHINS, cough, fever chills, rigors, lightheadedness, syncope. However, despite decreased dose of medications, she continued to feel palpitations especially after taking medications. She has been measuring her BP and SBP was consistently in 100-110s, and she spontaneously stopped taking BP meds at the end of April. Palpitation resolved and did not recur afterwards. She continued to measure blood pressures and her SBP has been in 120s and DBP in 80s. \par \par \par She is tolerating PO well. Trying to eat low sodium diet. Her  area healed well and started to go to the gym again.\par

## 2022-05-20 DIAGNOSIS — Z3A.25 25 WEEKS GESTATION OF PREGNANCY: ICD-10-CM

## 2022-05-20 DIAGNOSIS — O14.12 SEVERE PRE-ECLAMPSIA, SECOND TRIMESTER: ICD-10-CM

## 2022-05-20 DIAGNOSIS — O36.5930 MATERNAL CARE FOR OTHER KNOWN OR SUSPECTED POOR FETAL GROWTH, THIRD TRIMESTER, NOT APPLICABLE OR UNSPECIFIED: ICD-10-CM

## 2022-05-20 DIAGNOSIS — O28.3 ABNORMAL ULTRASONIC FINDING ON ANTENATAL SCREENING OF MOTHER: ICD-10-CM

## 2022-09-13 ENCOUNTER — APPOINTMENT (OUTPATIENT)
Dept: CARDIOLOGY | Facility: HOSPITAL | Age: 27
End: 2022-09-13

## 2022-09-30 NOTE — ED CDU PROVIDER INITIAL DAY NOTE - DETAILS
Pt is a 27 y.o female  currently 1 week postpartum, delivered at 25 weeks 2/2 pre-eclampsia (on nifedipine) who presents to ED c/o palpitations x 1 day. Pt seen and worked up in ED; Labs WNL. CXR normal. d-dimer elevated. CTA normal. Pt had one episode of sinus tachycardia at 150's. Pt seen by cards. Pt transferred to CDU for tele and echo. no

## 2023-10-04 NOTE — OB PROVIDER H&P - NS_PHYSICALALLNEG_OBGYN_ALL_OB
PAST SURGICAL HISTORY:  No significant past surgical history     
All other review of systems negative, except as noted in HPI

## 2024-03-14 NOTE — OB PROVIDER TRIAGE NOTE - NSPROVTRIAGESELHIDDEN_OBGYN_ALL_OB_FT
Problem: Skin Integrity Alteration  Goal: Participates in wound care activities  Note:     Wound Care RN Visit     Patient seen in wound clinic for RN dressing change to abdominal wound. Wound stable without signs or symptoms of infection. Will continue current therapy plan. Wound care completed as ordered. Patient aware to call wound clinic with any questions or concerns.       Wound was not packed to depth last visit. The full wound depth currently is 4.5cm.  The full depth location is slightly to 2 o clock of the central wound. It is a small area of depth that can only be probed with small plastic end of q-tip.      
[NS_AttendInformed_OBGYN_ALL_OB:[id number]=]

## 2024-08-20 ENCOUNTER — NON-APPOINTMENT (OUTPATIENT)
Age: 29
End: 2024-08-20

## 2024-08-20 ENCOUNTER — APPOINTMENT (OUTPATIENT)
Dept: CARDIOLOGY | Facility: CLINIC | Age: 29
End: 2024-08-20
Payer: MEDICAID

## 2024-08-20 VITALS
DIASTOLIC BLOOD PRESSURE: 107 MMHG | OXYGEN SATURATION: 98 % | SYSTOLIC BLOOD PRESSURE: 147 MMHG | BODY MASS INDEX: 30.63 KG/M2 | HEART RATE: 103 BPM | WEIGHT: 154 LBS | HEIGHT: 59.5 IN

## 2024-08-20 DIAGNOSIS — Z83.438 FAMILY HISTORY OF OTHER DISORDER OF LIPOPROTEIN METABOLISM AND OTHER LIPIDEMIA: ICD-10-CM

## 2024-08-20 DIAGNOSIS — Z87.42 PERSONAL HISTORY OF OTHER DISEASES OF THE FEMALE GENITAL TRACT: ICD-10-CM

## 2024-08-20 DIAGNOSIS — R00.2 PALPITATIONS: ICD-10-CM

## 2024-08-20 DIAGNOSIS — Z82.49 FAMILY HISTORY OF ISCHEMIC HEART DISEASE AND OTHER DISEASES OF THE CIRCULATORY SYSTEM: ICD-10-CM

## 2024-08-20 DIAGNOSIS — I10 ESSENTIAL (PRIMARY) HYPERTENSION: ICD-10-CM

## 2024-08-20 DIAGNOSIS — O36.5990 MATERNAL CARE FOR OTHER KNOWN OR SUSPECTED POOR FETAL GROWTH, UNSPECIFIED TRIMESTER, NOT APPLICABLE OR UNSPECIFIED: ICD-10-CM

## 2024-08-20 DIAGNOSIS — O14.90 UNSPECIFIED PRE-ECLAMPSIA, UNSPECIFIED TRIMESTER: ICD-10-CM

## 2024-08-20 PROCEDURE — 93000 ELECTROCARDIOGRAM COMPLETE: CPT

## 2024-08-20 PROCEDURE — 99214 OFFICE O/P EST MOD 30 MIN: CPT | Mod: 25

## 2024-08-20 RX ORDER — LOSARTAN POTASSIUM 25 MG/1
25 TABLET, FILM COATED ORAL DAILY
Qty: 30 | Refills: 3 | Status: ACTIVE | COMMUNITY
Start: 2024-08-20 | End: 1900-01-01

## 2024-08-20 NOTE — ASSESSMENT
[FreeTextEntry1] : This is a 29 year old female that [facility] for a cardiovascular evaluation in the setting of postpartum hypertension. She carries a history of gestational hypertension and preeclampsia at 21 weeks' gestation-> pregnancy complicated by emergent  delivery at 25 weeks, C/S for IUGR and placental pathology. Delivered in 2022.  #Hypertension   Patient continues to demonstrate elevated blood pressure   BP today:  148/116-> asymptomatic   She is currently on no medication.   Considering her medical history and todays blood pressure elevation, recommending to start    Losartan 25 mg QD   Advised patient to purchase a home BP monitor   Requested a one week BP log on new medication   Explained that this medication is not safe for pregnancy and when considering a second pregnancy,    must discontinue.  #Adverse Cardiovascular Risk Factors  Personal history of hypertension, preeclampsia and IUGR  Family history of significant for paternal premature CAD, MI and CABG () at age 59  Recommending a baseline cardiovascular work up: Echocardiogram to assess cardiac structure and function Exercise stress testing to assess functional status Full blood work panel:  CBC,CMP, Hgb A1C, TSH, Lipid profile  - Encouraged patient to participate in  healthy exercise and eating habits, focusing on a Mediterranean style of eating and aiming for the recommended 150 minutes per week of moderate physical activity.  - Encouraged the patient to find healthy outlets and coping mechanisms to help manage stress, such as physical activity/exercise, reducing workload if possible, spending time with family and friends, engaging in an enjoyable hobby, or using meditation or mindfulness techniques.  I spent 45 minutes evaluating patient's history, family medical history and blood pressure management, ordering tests and providing documentation.

## 2024-08-20 NOTE — HISTORY OF PRESENT ILLNESS
[FreeTextEntry1] : This is a 29 year old female that presents to [facility] for a cardiovascular evaluation in the setting of postpartum hypertension.  She carries a history of gestational hypertension and preeclampsia at 21 weeks' gestation-> pregnancy complicated by emergent  delivery at 25 weeks, C/S for IUGR and placental pathology. [place] remained in the NICU for 4 months with multiple complications. Baby boy is now, 2 years of age. Long term issues with chronic lung disease, visual impairment.   **During pregnancy, patient was taking Nicardipine with good BP control, however, she was experiencing intermittent episodes of palpitations which continued after delivery. The palpitations became more frequent and worsened with the up titration of Nicardipine and spontaneously resolved when the medication was discontinued She was started on Labetalol. She stopped taking all meds for blood pressure in 2022.    +Family history-> significant for paternal premature CAD, MI and CABG () at age 59   Blood pressure today: elevated-> 148/116-> asymptomatic  She reports feeling well and denies any issues with shortness of breath, chest discomfort or palpitations.

## 2024-08-20 NOTE — HISTORY OF PRESENT ILLNESS
[FreeTextEntry1] : This is a 29 year old female that presents to [facility] for a cardiovascular evaluation in the setting of postpartum hypertension.  She carries a history of gestational hypertension and preeclampsia at 21 weeks' gestation-> pregnancy complicated by emergent  delivery at 25 weeks, C/S for IUGR and placental pathology. [place] remained in the NICU for 4 months with multiple complications. Baby boy is now, 2 years of age. Long term issues with chronic lung disease, visual impairment.   **During pregnancy, patient was taking Nicardipine with good BP control, however, she was experiencing intermittent episodes of palpitations which continued after delivery. The palpitations became more frequent and worsened with the up titration of Nicardipine and spontaneously resolved when the medication was discontinued She was started on Labetalol. She stopped taking all meds for blood pressure in 2022.    +Family history-> significant for paternal premature CAD, MI and CABG () at age 59   Blood pressure today: elevated-> 148/116-> asymptomatic  She reports feeling well and denies any issues with shortness of breath, chest discomfort or palpitations.

## 2024-08-20 NOTE — PHYSICAL EXAM
[Normal Rate] : normal [Rhythm Regular] : regular [Normal S1] : normal S1 [Normal S2] : normal S2 [Normal] : moves all extremities, no focal deficits, normal speech [de-identified] : BMI 30.58

## 2024-08-20 NOTE — PHYSICAL EXAM
[Normal Rate] : normal [Rhythm Regular] : regular [Normal S1] : normal S1 [Normal S2] : normal S2 [Normal] : moves all extremities, no focal deficits, normal speech [de-identified] : BMI 30.58

## 2024-08-20 NOTE — HISTORY OF PRESENT ILLNESS
[FreeTextEntry1] : This is a 29 year old female that presents to [facility] for a cardiovascular evaluation in the setting of postpartum hypertension.  She carries a history of gestational hypertension and preeclampsia at 21 weeks' gestation-> pregnancy complicated by emergent  delivery at 25 weeks, C/S for IUGR and placental pathology. [place] remained in the NICU for 4 months with multiple complications. Baby boy is now, 2 years of age. Long term issues with chronic lung disease, visual impairment.   **During pregnancy, patient was taking Nicardipine with good BP control, however, she was experiencing intermittent episodes of palpitations which continued after delivery. The palpitations became more frequent and worsened with the up titration of Nicardipine and spontaneously resolved when the medication was discontinued She was started on Labetalol. She stopped taking all meds for blood pressure in 2022.    +Family history-> significant for paternal premature CAD, MI and CABG () at age 59   Blood pressure today: elevated-> 148/116-> asymptomatic  She reports feeling well and denies any issues with shortness of breath, chest discomfort or palpitations.

## 2024-08-20 NOTE — PHYSICAL EXAM
[Normal Rate] : normal [Rhythm Regular] : regular [Normal S1] : normal S1 [Normal S2] : normal S2 [Normal] : moves all extremities, no focal deficits, normal speech [de-identified] : BMI 30.58

## 2024-08-22 ENCOUNTER — ASOB RESULT (OUTPATIENT)
Age: 29
End: 2024-08-22

## 2024-08-22 ENCOUNTER — APPOINTMENT (OUTPATIENT)
Dept: MATERNAL FETAL MEDICINE | Facility: CLINIC | Age: 29
End: 2024-08-22

## 2024-08-22 PROCEDURE — 99215 OFFICE O/P EST HI 40 MIN: CPT | Mod: 95

## 2024-08-27 ENCOUNTER — NON-APPOINTMENT (OUTPATIENT)
Age: 29
End: 2024-08-27

## 2024-08-27 LAB
ALBUMIN SERPL ELPH-MCNC: 4.4 G/DL
ALP BLD-CCNC: 113 U/L
ALT SERPL-CCNC: 22 U/L
ANION GAP SERPL CALC-SCNC: 15 MMOL/L
AST SERPL-CCNC: 18 U/L
BILIRUB SERPL-MCNC: 0.7 MG/DL
BUN SERPL-MCNC: 11 MG/DL
CALCIUM SERPL-MCNC: 9.3 MG/DL
CHLORIDE SERPL-SCNC: 101 MMOL/L
CHOLEST SERPL-MCNC: 171 MG/DL
CO2 SERPL-SCNC: 23 MMOL/L
CREAT SERPL-MCNC: 0.7 MG/DL
EGFR: 119 ML/MIN/1.73M2
ESTIMATED AVERAGE GLUCOSE: 140 MG/DL
GLUCOSE SERPL-MCNC: 115 MG/DL
HBA1C MFR BLD HPLC: 6.5 %
HCT VFR BLD CALC: 43.6 %
HDLC SERPL-MCNC: 44 MG/DL
HGB BLD-MCNC: 14.4 G/DL
LDLC SERPL CALC-MCNC: 101 MG/DL
MCHC RBC-ENTMCNC: 29.6 PG
MCHC RBC-ENTMCNC: 33 GM/DL
MCV RBC AUTO: 89.7 FL
NONHDLC SERPL-MCNC: 127 MG/DL
PLATELET # BLD AUTO: 307 K/UL
POTASSIUM SERPL-SCNC: 4.3 MMOL/L
PROT SERPL-MCNC: 6.9 G/DL
RBC # BLD: 4.86 M/UL
RBC # FLD: 13.2 %
SODIUM SERPL-SCNC: 140 MMOL/L
TRIGL SERPL-MCNC: 149 MG/DL
TSH SERPL-ACNC: 2.47 UIU/ML
WBC # FLD AUTO: 8.58 K/UL

## 2024-08-28 ENCOUNTER — APPOINTMENT (OUTPATIENT)
Dept: CARDIOLOGY | Facility: CLINIC | Age: 29
End: 2024-08-28

## 2024-09-09 ENCOUNTER — APPOINTMENT (OUTPATIENT)
Dept: CV DIAGNOSTICS | Facility: HOSPITAL | Age: 29
End: 2024-09-09

## 2024-09-29 NOTE — OB PROVIDER H&P - NSFIRSTDATEVISIT_OBGYN_ALL_OB
CHIEF COMPLAINT: wound check  24: repeat midtransverse  section. Prevena applied due to wound infection at prior delivery.   Was seen here .   Incision: clean and dry. Some separation of mid and left portion of the incision but sutures are intact.   No sign of infection.   Instructions and precautions given and asked to return today.  She feels good.     Vitals:    24 0921   BP: 120/68   BP Location: RUE - Right upper extremity   Patient Position: Sitting   Cuff Size: Large Adult   Pulse: 88   Weight: 94.6 kg (208 lb 8 oz)   Height: 5' (1.524 m)   LMP: 2023      Appears well  Alert, oriented, pleasant  Abd incision healing well. Clean and dry. Only a superficial (1-2mm)separation remains    A/p healing well post op. RTO as scheduled  for pp check.   Na Brewer MD    Unknown

## 2024-11-05 NOTE — OB RN TRIAGE NOTE - NSLDARRIVAL_OBGYN_ALL_OB_START_DATE
You can access the FollowMyHealth Patient Portal offered by [facility] by registering at the following website: [email or web address]. By joining [email or web address]’s FollowMyHealth portal, you will also be able to view your health information using other applications (apps) compatible with our system. 19-Jan-2022 08:18

## 2024-12-16 NOTE — DISCHARGE NOTE OB - LAUNCH MEDICATION RECONCILIATION
Products Recommended: Blue Lizard, Elta MD, Eucerin sunscreens Detail Level: Generalized General Sunscreen Counseling: I recommended a broad spectrum sunscreen with a SPF of 30 or higher.  I explained that SPF 30 sunscreens block approximately 97 percent of the sun's harmful rays.  Sunscreens should be applied at least 15 minutes prior to expected sun exposure and then every 2 hours after that as long as sun exposure continues. If swimming or exercising sunscreen should be reapplied every 45 minutes to an hour after getting wet or sweating.  One ounce, or the equivalent of a shot glass full of sunscreen, is adequate to protect the skin not covered by a bathing suit. I also recommended a lip balm with a sunscreen as well. Sun protective clothing can be used in lieu of sunscreen but must be worn the entire time you are exposed to the sun's rays. Zinc Based sunscreen preferred. Detail Level: Detailed General Sunscreen Counseling: I recommended a broad spectrum sunscreen with a SPF of 30 or higher.  I explained that SPF 30 sunscreens block approximately 97 percent of the sun's harmful rays.  Sunscreens should be applied at least 15 minutes prior to expected sun exposure and then every 2 hours after that as long as sun exposure continues. If swimming or exercising sunscreen should be reapplied every 45 minutes to an hour after getting wet or sweating.  One ounce, or the equivalent of a shot glass full of sunscreen, is adequate to protect the skin not covered by a bathing suit. I also recommended a lip balm with a sunscreen as well. Sun protective clothing can be used in lieu of sunscreen but must be worn the entire time you are exposed to the sun's rays. <<-----Click here for Discharge Medication Review

## 2025-04-18 ENCOUNTER — RX RENEWAL (OUTPATIENT)
Age: 30
End: 2025-04-18

## 2025-08-28 ENCOUNTER — APPOINTMENT (OUTPATIENT)
Facility: CLINIC | Age: 30
End: 2025-08-28
Payer: MEDICAID

## 2025-08-28 VITALS — DIASTOLIC BLOOD PRESSURE: 123 MMHG | SYSTOLIC BLOOD PRESSURE: 193 MMHG

## 2025-08-28 VITALS — DIASTOLIC BLOOD PRESSURE: 133 MMHG | SYSTOLIC BLOOD PRESSURE: 166 MMHG

## 2025-08-28 VITALS — SYSTOLIC BLOOD PRESSURE: 170 MMHG | DIASTOLIC BLOOD PRESSURE: 90 MMHG

## 2025-08-28 LAB — HCG UR QL: NEGATIVE

## 2025-08-28 PROCEDURE — 99214 OFFICE O/P EST MOD 30 MIN: CPT | Mod: 25

## 2025-08-28 PROCEDURE — 81025 URINE PREGNANCY TEST: CPT

## 2025-08-28 PROCEDURE — 99459 PELVIC EXAMINATION: CPT

## 2025-08-28 PROCEDURE — 99395 PREV VISIT EST AGE 18-39: CPT

## 2025-08-28 PROCEDURE — 76830 TRANSVAGINAL US NON-OB: CPT

## 2025-08-29 LAB — HPV HIGH+LOW RISK DNA PNL CVX: NOT DETECTED

## 2025-09-03 LAB — CYTOLOGY CVX/VAG DOC THIN PREP: NORMAL
